# Patient Record
Sex: FEMALE | Race: OTHER | NOT HISPANIC OR LATINO
[De-identification: names, ages, dates, MRNs, and addresses within clinical notes are randomized per-mention and may not be internally consistent; named-entity substitution may affect disease eponyms.]

---

## 2017-02-22 ENCOUNTER — FORM ENCOUNTER (OUTPATIENT)
Age: 67
End: 2017-02-22

## 2017-02-23 ENCOUNTER — OUTPATIENT (OUTPATIENT)
Dept: OUTPATIENT SERVICES | Facility: HOSPITAL | Age: 67
LOS: 1 days | End: 2017-02-23
Payer: MEDICARE

## 2017-02-23 ENCOUNTER — APPOINTMENT (OUTPATIENT)
Dept: MRI IMAGING | Facility: CLINIC | Age: 67
End: 2017-02-23

## 2017-02-23 DIAGNOSIS — D11.0 BENIGN NEOPLASM OF PAROTID GLAND: ICD-10-CM

## 2017-02-23 PROCEDURE — A9585: CPT

## 2017-02-23 PROCEDURE — 82565 ASSAY OF CREATININE: CPT

## 2017-02-23 PROCEDURE — 70543 MRI ORBT/FAC/NCK W/O &W/DYE: CPT

## 2017-03-27 ENCOUNTER — APPOINTMENT (OUTPATIENT)
Dept: OTOLARYNGOLOGY | Facility: CLINIC | Age: 67
End: 2017-03-27

## 2017-03-27 VITALS
BODY MASS INDEX: 25.61 KG/M2 | WEIGHT: 140 LBS | SYSTOLIC BLOOD PRESSURE: 124 MMHG | DIASTOLIC BLOOD PRESSURE: 75 MMHG | HEART RATE: 83 BPM

## 2017-09-27 ENCOUNTER — FORM ENCOUNTER (OUTPATIENT)
Age: 67
End: 2017-09-27

## 2017-09-28 ENCOUNTER — APPOINTMENT (OUTPATIENT)
Dept: MRI IMAGING | Facility: CLINIC | Age: 67
End: 2017-09-28
Payer: MEDICARE

## 2017-09-28 ENCOUNTER — OUTPATIENT (OUTPATIENT)
Dept: OUTPATIENT SERVICES | Facility: HOSPITAL | Age: 67
LOS: 1 days | End: 2017-09-28
Payer: MEDICARE

## 2017-09-28 DIAGNOSIS — Z00.8 ENCOUNTER FOR OTHER GENERAL EXAMINATION: ICD-10-CM

## 2017-09-28 PROCEDURE — 70543 MRI ORBT/FAC/NCK W/O &W/DYE: CPT | Mod: 26

## 2017-09-29 PROCEDURE — 70543 MRI ORBT/FAC/NCK W/O &W/DYE: CPT

## 2017-09-29 PROCEDURE — 82565 ASSAY OF CREATININE: CPT

## 2017-09-29 PROCEDURE — A9585: CPT

## 2017-10-16 ENCOUNTER — APPOINTMENT (OUTPATIENT)
Dept: OTOLARYNGOLOGY | Facility: CLINIC | Age: 67
End: 2017-10-16
Payer: MEDICARE

## 2017-10-16 VITALS
SYSTOLIC BLOOD PRESSURE: 130 MMHG | BODY MASS INDEX: 25.61 KG/M2 | DIASTOLIC BLOOD PRESSURE: 71 MMHG | WEIGHT: 140 LBS | HEART RATE: 80 BPM

## 2017-10-16 PROCEDURE — 99214 OFFICE O/P EST MOD 30 MIN: CPT

## 2018-03-12 ENCOUNTER — FORM ENCOUNTER (OUTPATIENT)
Age: 68
End: 2018-03-12

## 2018-03-13 ENCOUNTER — OUTPATIENT (OUTPATIENT)
Dept: OUTPATIENT SERVICES | Facility: HOSPITAL | Age: 68
LOS: 1 days | End: 2018-03-13
Payer: MEDICARE

## 2018-03-13 ENCOUNTER — APPOINTMENT (OUTPATIENT)
Dept: ULTRASOUND IMAGING | Facility: CLINIC | Age: 68
End: 2018-03-13
Payer: MEDICARE

## 2018-03-13 DIAGNOSIS — D11.0 BENIGN NEOPLASM OF PAROTID GLAND: ICD-10-CM

## 2018-03-13 PROCEDURE — 76536 US EXAM OF HEAD AND NECK: CPT

## 2018-03-13 PROCEDURE — 76536 US EXAM OF HEAD AND NECK: CPT | Mod: 26

## 2018-05-29 ENCOUNTER — APPOINTMENT (OUTPATIENT)
Dept: OTOLARYNGOLOGY | Facility: CLINIC | Age: 68
End: 2018-05-29
Payer: MEDICARE

## 2018-05-29 VITALS
SYSTOLIC BLOOD PRESSURE: 149 MMHG | WEIGHT: 140 LBS | DIASTOLIC BLOOD PRESSURE: 73 MMHG | HEIGHT: 62 IN | HEART RATE: 80 BPM | BODY MASS INDEX: 25.76 KG/M2

## 2018-05-29 PROCEDURE — 99214 OFFICE O/P EST MOD 30 MIN: CPT

## 2018-10-29 ENCOUNTER — APPOINTMENT (OUTPATIENT)
Dept: OTOLARYNGOLOGY | Facility: CLINIC | Age: 68
End: 2018-10-29

## 2019-03-04 ENCOUNTER — INPATIENT (INPATIENT)
Facility: HOSPITAL | Age: 69
LOS: 2 days | Discharge: HOME | DRG: 202 | End: 2019-03-07
Attending: FAMILY MEDICINE | Admitting: FAMILY MEDICINE
Payer: MEDICARE

## 2019-03-04 VITALS — WEIGHT: 138.01 LBS

## 2019-03-04 DIAGNOSIS — J18.9 PNEUMONIA, UNSPECIFIED ORGANISM: ICD-10-CM

## 2019-03-04 LAB
ALBUMIN SERPL ELPH-MCNC: 3 G/DL — LOW (ref 3.3–5)
ALP SERPL-CCNC: 52 U/L — SIGNIFICANT CHANGE UP (ref 40–120)
ALT FLD-CCNC: 20 U/L — SIGNIFICANT CHANGE UP (ref 12–78)
ANION GAP SERPL CALC-SCNC: 7 MMOL/L — SIGNIFICANT CHANGE UP (ref 5–17)
APPEARANCE UR: CLEAR — SIGNIFICANT CHANGE UP
AST SERPL-CCNC: 15 U/L — SIGNIFICANT CHANGE UP (ref 15–37)
BASOPHILS # BLD AUTO: 0.03 K/UL — SIGNIFICANT CHANGE UP (ref 0–0.2)
BASOPHILS NFR BLD AUTO: 0.6 % — SIGNIFICANT CHANGE UP (ref 0–2)
BILIRUB SERPL-MCNC: 0.3 MG/DL — SIGNIFICANT CHANGE UP (ref 0.2–1.2)
BILIRUB UR-MCNC: NEGATIVE — SIGNIFICANT CHANGE UP
BUN SERPL-MCNC: 11 MG/DL — SIGNIFICANT CHANGE UP (ref 7–23)
CALCIUM SERPL-MCNC: 7.6 MG/DL — LOW (ref 8.5–10.1)
CHLORIDE SERPL-SCNC: 100 MMOL/L — SIGNIFICANT CHANGE UP (ref 96–108)
CO2 SERPL-SCNC: 25 MMOL/L — SIGNIFICANT CHANGE UP (ref 22–31)
COLOR SPEC: YELLOW — SIGNIFICANT CHANGE UP
CREAT SERPL-MCNC: 1 MG/DL — SIGNIFICANT CHANGE UP (ref 0.5–1.3)
DIFF PNL FLD: ABNORMAL
EOSINOPHIL # BLD AUTO: 0.06 K/UL — SIGNIFICANT CHANGE UP (ref 0–0.5)
EOSINOPHIL NFR BLD AUTO: 1.2 % — SIGNIFICANT CHANGE UP (ref 0–6)
FLU A RESULT: SIGNIFICANT CHANGE UP
FLU A RESULT: SIGNIFICANT CHANGE UP
FLUAV AG NPH QL: SIGNIFICANT CHANGE UP
FLUBV AG NPH QL: SIGNIFICANT CHANGE UP
GLUCOSE SERPL-MCNC: 98 MG/DL — SIGNIFICANT CHANGE UP (ref 70–99)
GLUCOSE UR QL: NEGATIVE — SIGNIFICANT CHANGE UP
HCT VFR BLD CALC: 41.6 % — SIGNIFICANT CHANGE UP (ref 34.5–45)
HGB BLD-MCNC: 13.8 G/DL — SIGNIFICANT CHANGE UP (ref 11.5–15.5)
IMM GRANULOCYTES NFR BLD AUTO: 0.4 % — SIGNIFICANT CHANGE UP (ref 0–1.5)
KETONES UR-MCNC: NEGATIVE — SIGNIFICANT CHANGE UP
LACTATE SERPL-SCNC: 1.8 MMOL/L — SIGNIFICANT CHANGE UP (ref 0.7–2)
LEUKOCYTE ESTERASE UR-ACNC: NEGATIVE — SIGNIFICANT CHANGE UP
LYMPHOCYTES # BLD AUTO: 0.59 K/UL — LOW (ref 1–3.3)
LYMPHOCYTES # BLD AUTO: 12 % — LOW (ref 13–44)
MCHC RBC-ENTMCNC: 27.4 PG — SIGNIFICANT CHANGE UP (ref 27–34)
MCHC RBC-ENTMCNC: 33.2 GM/DL — SIGNIFICANT CHANGE UP (ref 32–36)
MCV RBC AUTO: 82.5 FL — SIGNIFICANT CHANGE UP (ref 80–100)
MONOCYTES # BLD AUTO: 0.32 K/UL — SIGNIFICANT CHANGE UP (ref 0–0.9)
MONOCYTES NFR BLD AUTO: 6.5 % — SIGNIFICANT CHANGE UP (ref 2–14)
NEUTROPHILS # BLD AUTO: 3.91 K/UL — SIGNIFICANT CHANGE UP (ref 1.8–7.4)
NEUTROPHILS NFR BLD AUTO: 79.3 % — HIGH (ref 43–77)
NITRITE UR-MCNC: NEGATIVE — SIGNIFICANT CHANGE UP
NRBC # BLD: 0 /100 WBCS — SIGNIFICANT CHANGE UP (ref 0–0)
PH UR: 6.5 — SIGNIFICANT CHANGE UP (ref 5–8)
PLATELET # BLD AUTO: 376 K/UL — SIGNIFICANT CHANGE UP (ref 150–400)
POTASSIUM SERPL-MCNC: 4.4 MMOL/L — SIGNIFICANT CHANGE UP (ref 3.5–5.3)
POTASSIUM SERPL-SCNC: 4.4 MMOL/L — SIGNIFICANT CHANGE UP (ref 3.5–5.3)
PROT SERPL-MCNC: 5.9 G/DL — LOW (ref 6–8.3)
PROT UR-MCNC: 25 MG/DL
RBC # BLD: 5.04 M/UL — SIGNIFICANT CHANGE UP (ref 3.8–5.2)
RBC # FLD: 14.8 % — HIGH (ref 10.3–14.5)
RSV RESULT: SIGNIFICANT CHANGE UP
RSV RNA RESP QL NAA+PROBE: SIGNIFICANT CHANGE UP
SODIUM SERPL-SCNC: 132 MMOL/L — LOW (ref 135–145)
SP GR SPEC: 1.01 — SIGNIFICANT CHANGE UP (ref 1.01–1.02)
UROBILINOGEN FLD QL: NEGATIVE — SIGNIFICANT CHANGE UP
WBC # BLD: 4.93 K/UL — SIGNIFICANT CHANGE UP (ref 3.8–10.5)
WBC # FLD AUTO: 4.93 K/UL — SIGNIFICANT CHANGE UP (ref 3.8–10.5)

## 2019-03-04 PROCEDURE — 99285 EMERGENCY DEPT VISIT HI MDM: CPT

## 2019-03-04 PROCEDURE — 71046 X-RAY EXAM CHEST 2 VIEWS: CPT | Mod: 26

## 2019-03-04 PROCEDURE — 93010 ELECTROCARDIOGRAM REPORT: CPT

## 2019-03-04 PROCEDURE — 71260 CT THORAX DX C+: CPT | Mod: 26

## 2019-03-04 RX ORDER — HEPARIN SODIUM 5000 [USP'U]/ML
5000 INJECTION INTRAVENOUS; SUBCUTANEOUS EVERY 12 HOURS
Qty: 0 | Refills: 0 | Status: DISCONTINUED | OUTPATIENT
Start: 2019-03-04 | End: 2019-03-05

## 2019-03-04 RX ORDER — SODIUM CHLORIDE 9 MG/ML
1000 INJECTION, SOLUTION INTRAVENOUS
Qty: 0 | Refills: 0 | Status: DISCONTINUED | OUTPATIENT
Start: 2019-03-04 | End: 2019-03-07

## 2019-03-04 RX ORDER — IPRATROPIUM/ALBUTEROL SULFATE 18-103MCG
3 AEROSOL WITH ADAPTER (GRAM) INHALATION EVERY 6 HOURS
Qty: 0 | Refills: 0 | Status: DISCONTINUED | OUTPATIENT
Start: 2019-03-04 | End: 2019-03-07

## 2019-03-04 RX ORDER — IPRATROPIUM/ALBUTEROL SULFATE 18-103MCG
3 AEROSOL WITH ADAPTER (GRAM) INHALATION ONCE
Qty: 0 | Refills: 0 | Status: COMPLETED | OUTPATIENT
Start: 2019-03-04 | End: 2019-03-04

## 2019-03-04 RX ORDER — BUDESONIDE, MICRONIZED 100 %
0.5 POWDER (GRAM) MISCELLANEOUS
Qty: 0 | Refills: 0 | Status: DISCONTINUED | OUTPATIENT
Start: 2019-03-04 | End: 2019-03-07

## 2019-03-04 RX ORDER — DEXTROSE 50 % IN WATER 50 %
25 SYRINGE (ML) INTRAVENOUS ONCE
Qty: 0 | Refills: 0 | Status: DISCONTINUED | OUTPATIENT
Start: 2019-03-04 | End: 2019-03-07

## 2019-03-04 RX ORDER — DEXTROSE 50 % IN WATER 50 %
12.5 SYRINGE (ML) INTRAVENOUS ONCE
Qty: 0 | Refills: 0 | Status: DISCONTINUED | OUTPATIENT
Start: 2019-03-04 | End: 2019-03-07

## 2019-03-04 RX ORDER — SODIUM CHLORIDE 9 MG/ML
1000 INJECTION INTRAMUSCULAR; INTRAVENOUS; SUBCUTANEOUS ONCE
Qty: 0 | Refills: 0 | Status: COMPLETED | OUTPATIENT
Start: 2019-03-04 | End: 2019-03-04

## 2019-03-04 RX ORDER — ALBUTEROL 90 UG/1
1 AEROSOL, METERED ORAL EVERY 4 HOURS
Qty: 0 | Refills: 0 | Status: DISCONTINUED | OUTPATIENT
Start: 2019-03-04 | End: 2019-03-07

## 2019-03-04 RX ORDER — TIOTROPIUM BROMIDE 18 UG/1
1 CAPSULE ORAL; RESPIRATORY (INHALATION) DAILY
Qty: 0 | Refills: 0 | Status: DISCONTINUED | OUTPATIENT
Start: 2019-03-04 | End: 2019-03-07

## 2019-03-04 RX ORDER — DEXTROSE 50 % IN WATER 50 %
15 SYRINGE (ML) INTRAVENOUS ONCE
Qty: 0 | Refills: 0 | Status: DISCONTINUED | OUTPATIENT
Start: 2019-03-04 | End: 2019-03-07

## 2019-03-04 RX ORDER — LACTOBACILLUS ACIDOPHILUS 100MM CELL
1 CAPSULE ORAL DAILY
Qty: 0 | Refills: 0 | Status: DISCONTINUED | OUTPATIENT
Start: 2019-03-04 | End: 2019-03-05

## 2019-03-04 RX ORDER — INSULIN LISPRO 100/ML
VIAL (ML) SUBCUTANEOUS
Qty: 0 | Refills: 0 | Status: DISCONTINUED | OUTPATIENT
Start: 2019-03-04 | End: 2019-03-07

## 2019-03-04 RX ORDER — GLUCAGON INJECTION, SOLUTION 0.5 MG/.1ML
1 INJECTION, SOLUTION SUBCUTANEOUS ONCE
Qty: 0 | Refills: 0 | Status: DISCONTINUED | OUTPATIENT
Start: 2019-03-04 | End: 2019-03-07

## 2019-03-04 RX ORDER — DILTIAZEM HCL 120 MG
120 CAPSULE, EXT RELEASE 24 HR ORAL DAILY
Qty: 0 | Refills: 0 | Status: DISCONTINUED | OUTPATIENT
Start: 2019-03-04 | End: 2019-03-05

## 2019-03-04 RX ORDER — INSULIN LISPRO 100/ML
VIAL (ML) SUBCUTANEOUS AT BEDTIME
Qty: 0 | Refills: 0 | Status: DISCONTINUED | OUTPATIENT
Start: 2019-03-04 | End: 2019-03-07

## 2019-03-04 RX ADMIN — SODIUM CHLORIDE 1000 MILLILITER(S): 9 INJECTION INTRAMUSCULAR; INTRAVENOUS; SUBCUTANEOUS at 19:01

## 2019-03-04 RX ADMIN — SODIUM CHLORIDE 1000 MILLILITER(S): 9 INJECTION INTRAMUSCULAR; INTRAVENOUS; SUBCUTANEOUS at 20:58

## 2019-03-04 RX ADMIN — Medication 1: at 23:44

## 2019-03-04 RX ADMIN — SODIUM CHLORIDE 1000 MILLILITER(S): 9 INJECTION INTRAMUSCULAR; INTRAVENOUS; SUBCUTANEOUS at 20:57

## 2019-03-04 RX ADMIN — Medication 3 MILLILITER(S): at 18:59

## 2019-03-04 RX ADMIN — Medication 125 MILLIGRAM(S): at 18:59

## 2019-03-04 RX ADMIN — SODIUM CHLORIDE 1000 MILLILITER(S): 9 INJECTION INTRAMUSCULAR; INTRAVENOUS; SUBCUTANEOUS at 22:59

## 2019-03-04 NOTE — H&P ADULT - HISTORY OF PRESENT ILLNESS
CHIDI WELLS is a 67 YO Female brought to ER complaining of productive cough and fever intermittent for about 1 week. .  Patient has history of asthma, HTN, HLD & DM.  Patient was seen by her PCP Dr. Buckley, and prescribed antibiotics last week with no improvement of symptoms.  Fever at home 105 degree Farenheit.  No nausea or vomiting.  No chest pain.

## 2019-03-04 NOTE — ED ADULT NURSE NOTE - NSIMPLEMENTINTERV_GEN_ALL_ED
Implemented All Universal Safety Interventions:  [place] to call system. Call bell, personal items and telephone within reach. Instruct patient to call for assistance. Room bathroom lighting operational. Non-slip footwear when patient is off stretcher. Physically safe environment: no spills, clutter or unnecessary equipment. Stretcher in lowest position, wheels locked, appropriate side rails in place.

## 2019-03-04 NOTE — CHART NOTE - NSCHARTNOTEFT_GEN_A_CORE
consult dictated    Impression:    Community Acquired Pneumonia  Asthma with exacerbation    Plan:    IV antibiotics  IV steroids  Bronchodilators

## 2019-03-04 NOTE — ED PROVIDER NOTE - OBJECTIVE STATEMENT
69 y/o F with hx of asthma, HTN, HLD, DM with c/o productive cough and fever intermittent x 1 week.  Pt was seen by her PCP Dr. Buckley, and prescribed antibiotics last week with no improvement of symptoms. T max at home 105.  Last Tylenol 10 am

## 2019-03-04 NOTE — H&P ADULT - MUSCULOSKELETAL
details… detailed exam no joint erythema/no joint warmth/ROM intact/no calf tenderness/no joint swelling

## 2019-03-04 NOTE — ED ADULT NURSE NOTE - OBJECTIVE STATEMENT
Patient received in ED with c/o fever and cough since 4 days. Finished ABT course from [facility]. weak. Alert and oriented. Son at bedside. Ambulatory with assistance due to fever and weakness. Vitals checked and recorded.

## 2019-03-04 NOTE — ED ADULT NURSE REASSESSMENT NOTE - NS ED NURSE REASSESS COMMENT FT1
Patient is complaining of severe chills. Warm blankets provided. Taken for CXRay. Son at bedside. IV fluid bolus on flow.

## 2019-03-04 NOTE — H&P ADULT - NSHPLABSRESULTS_GEN_ALL_CORE
13.8   4.93  )-----------( 376      ( 04 Mar 2019 18:59 )             41.6     04 Mar 2019 19:49    132    |  100    |  11     ----------------------------<  98     4.4     |  25     |  1.00     Ca    7.6        04 Mar 2019 19:49    TPro  5.9    /  Alb  3.0    /  TBili  0.3    /  DBili  x      /  AST  15     /  ALT  20     /  AlkPhos  52     04 Mar 2019 19:49    LIVER FUNCTIONS - ( 04 Mar 2019 19:49 )  Alb: 3.0 g/dL / Pro: 5.9 g/dL / ALK PHOS: 52 U/L / ALT: 20 U/L / AST: 15 U/L / GGT: x             CAPILLARY BLOOD GLUCOSE      POCT Blood Glucose.: 294 mg/dL (04 Mar 2019 23:33)        Urinalysis Basic - ( 04 Mar 2019 18:59 )    Color: Yellow / Appearance: Clear / S.010 / pH: x  Gluc: x / Ketone: Negative  / Bili: Negative / Urobili: Negative   Blood: x / Protein: 25 mg/dL / Nitrite: Negative   Leuk Esterase: Negative / RBC: 3-5 /HPF / WBC 0-2   Sq Epi: x / Non Sq Epi: Occasional / Bacteria: x

## 2019-03-05 DIAGNOSIS — J18.9 PNEUMONIA, UNSPECIFIED ORGANISM: ICD-10-CM

## 2019-03-05 DIAGNOSIS — J45.901 UNSPECIFIED ASTHMA WITH (ACUTE) EXACERBATION: ICD-10-CM

## 2019-03-05 DIAGNOSIS — E11.9 TYPE 2 DIABETES MELLITUS WITHOUT COMPLICATIONS: ICD-10-CM

## 2019-03-05 DIAGNOSIS — I10 ESSENTIAL (PRIMARY) HYPERTENSION: ICD-10-CM

## 2019-03-05 LAB
CULTURE RESULTS: SIGNIFICANT CHANGE UP
ERYTHROCYTE [SEDIMENTATION RATE] IN BLOOD: 18 MM/HR — SIGNIFICANT CHANGE UP (ref 0–20)
HCV AB S/CO SERPL IA: 0.07 S/CO — SIGNIFICANT CHANGE UP (ref 0–0.79)
HCV AB SERPL-IMP: SIGNIFICANT CHANGE UP
SPECIMEN SOURCE: SIGNIFICANT CHANGE UP

## 2019-03-05 RX ORDER — LACTOBACILLUS ACIDOPHILUS 100MM CELL
1 CAPSULE ORAL
Qty: 0 | Refills: 0 | Status: DISCONTINUED | OUTPATIENT
Start: 2019-03-05 | End: 2019-03-07

## 2019-03-05 RX ORDER — GABAPENTIN 400 MG/1
100 CAPSULE ORAL
Qty: 0 | Refills: 0 | Status: DISCONTINUED | OUTPATIENT
Start: 2019-03-05 | End: 2019-03-07

## 2019-03-05 RX ORDER — HEPARIN SODIUM 5000 [USP'U]/ML
5000 INJECTION INTRAVENOUS; SUBCUTANEOUS EVERY 8 HOURS
Qty: 0 | Refills: 0 | Status: DISCONTINUED | OUTPATIENT
Start: 2019-03-05 | End: 2019-03-07

## 2019-03-05 RX ORDER — PANTOPRAZOLE SODIUM 20 MG/1
40 TABLET, DELAYED RELEASE ORAL
Qty: 0 | Refills: 0 | Status: DISCONTINUED | OUTPATIENT
Start: 2019-03-05 | End: 2019-03-07

## 2019-03-05 RX ORDER — GABAPENTIN 400 MG/1
100 CAPSULE ORAL THREE TIMES A DAY
Qty: 0 | Refills: 0 | Status: DISCONTINUED | OUTPATIENT
Start: 2019-03-05 | End: 2019-03-05

## 2019-03-05 RX ORDER — DILTIAZEM HCL 120 MG
120 CAPSULE, EXT RELEASE 24 HR ORAL DAILY
Qty: 0 | Refills: 0 | Status: DISCONTINUED | OUTPATIENT
Start: 2019-03-05 | End: 2019-03-07

## 2019-03-05 RX ADMIN — Medication 1: at 08:21

## 2019-03-05 RX ADMIN — Medication 0.5 MILLIGRAM(S): at 09:11

## 2019-03-05 RX ADMIN — Medication 3 MILLILITER(S): at 19:50

## 2019-03-05 RX ADMIN — Medication 1 TABLET(S): at 17:32

## 2019-03-05 RX ADMIN — Medication 4: at 12:31

## 2019-03-05 RX ADMIN — Medication 120 MILLIGRAM(S): at 05:27

## 2019-03-05 RX ADMIN — Medication 1 TABLET(S): at 08:22

## 2019-03-05 RX ADMIN — Medication 2.5 MILLIGRAM(S): at 05:27

## 2019-03-05 RX ADMIN — HEPARIN SODIUM 5000 UNIT(S): 5000 INJECTION INTRAVENOUS; SUBCUTANEOUS at 13:19

## 2019-03-05 RX ADMIN — GABAPENTIN 100 MILLIGRAM(S): 400 CAPSULE ORAL at 17:32

## 2019-03-05 RX ADMIN — Medication 4: at 17:20

## 2019-03-05 RX ADMIN — Medication 3 MILLILITER(S): at 08:15

## 2019-03-05 RX ADMIN — Medication 40 MILLIGRAM(S): at 05:27

## 2019-03-05 RX ADMIN — Medication 1 TABLET(S): at 13:19

## 2019-03-05 RX ADMIN — Medication 110 MILLIGRAM(S): at 13:19

## 2019-03-05 RX ADMIN — HEPARIN SODIUM 5000 UNIT(S): 5000 INJECTION INTRAVENOUS; SUBCUTANEOUS at 21:38

## 2019-03-05 RX ADMIN — Medication 0.5 MILLIGRAM(S): at 19:50

## 2019-03-05 RX ADMIN — HEPARIN SODIUM 5000 UNIT(S): 5000 INJECTION INTRAVENOUS; SUBCUTANEOUS at 05:27

## 2019-03-05 NOTE — PROGRESS NOTE ADULT - SUBJECTIVE AND OBJECTIVE BOX
Patient is a 68y old  Female who presents with a chief complaint of Pneumonia (05 Mar 2019 11:42)      INTERVAL /OVERNIGHT EVENTS: feels better than yesterday    MEDICATIONS  (STANDING):  ALBUTerol    90 MICROgram(s) HFA Inhaler 1 Puff(s) Inhalation every 4 hours  ALBUTerol/ipratropium for Nebulization 3 milliLiter(s) Nebulizer every 6 hours  buDESOnide   0.5 milliGRAM(s) Respule 0.5 milliGRAM(s) Inhalation two times a day  dextrose 5%. 1000 milliLiter(s) (50 mL/Hr) IV Continuous <Continuous>  dextrose 50% Injectable 12.5 Gram(s) IV Push once  dextrose 50% Injectable 25 Gram(s) IV Push once  dextrose 50% Injectable 25 Gram(s) IV Push once  diltiazem    milliGRAM(s) Oral daily  doxycycline IVPB      doxycycline IVPB 100 milliGRAM(s) IV Intermittent every 12 hours  enalapril 2.5 milliGRAM(s) Oral daily  gabapentin 100 milliGRAM(s) Oral two times a day  heparin  Injectable 5000 Unit(s) SubCutaneous every 8 hours  insulin lispro (HumaLOG) corrective regimen sliding scale   SubCutaneous three times a day before meals  insulin lispro (HumaLOG) corrective regimen sliding scale   SubCutaneous at bedtime  lactobacillus acidophilus 1 Tablet(s) Oral three times a day with meals  levoFLOXacin IVPB 750 milliGRAM(s) IV Intermittent every 24 hours  methylPREDNISolone sodium succinate Injectable 40 milliGRAM(s) IV Push daily  pantoprazole    Tablet 40 milliGRAM(s) Oral before breakfast  tiotropium 18 MICROgram(s) Capsule 1 Capsule(s) Inhalation daily    MEDICATIONS  (PRN):  dextrose 40% Gel 15 Gram(s) Oral once PRN Blood Glucose LESS THAN 70 milliGRAM(s)/deciLiter  glucagon  Injectable 1 milliGRAM(s) IntraMuscular once PRN Glucose <70 milliGRAM(s)/deciLiter      Allergies    penicillin (Unknown)    Intolerances        REVIEW OF SYSTEMS:  CONSTITUTIONAL: No fever, weight loss, or fatigue  EYES: No eye pain, visual disturbances, or discharge  ENMT:  No difficulty hearing, tinnitus, vertigo; No sinus or throat pain  NECK: No pain or stiffness  RESPIRATORY: No cough, wheezing, chills or hemoptysis; + shortness of breath  CARDIOVASCULAR: No chest pain, palpitations, dizziness, or leg swelling  GASTROINTESTINAL: No abdominal or epigastric pain. No nausea, vomiting, or hematemesis; No diarrhea or constipation. No melena or hematochezia.  GENITOURINARY: No dysuria, frequency, hematuria, or incontinence  NEUROLOGICAL: No headaches, memory loss, loss of strength, numbness, or tremors  SKIN: No itching, burning, rashes, or lesions   LYMPH NODES: No enlarged glands  ENDOCRINE: No heat or cold intolerance; No hair loss; No polydipsia or polyuria  MUSCULOSKELETAL: No joint pain or swelling; No muscle, back, or extremity pain  PSYCHIATRIC: No depression, anxiety, mood swings, or difficulty sleeping  HEME/LYMPH: No easy bruising, or bleeding gums  ALLERGY AND IMMUNOLOGIC: No hives or eczema    Vital Signs Last 24 Hrs  T(C): 37.4 (05 Mar 2019 14:03), Max: 37.4 (05 Mar 2019 14:03)  T(F): 99.4 (05 Mar 2019 14:03), Max: 99.4 (05 Mar 2019 14:03)  HR: 106 (05 Mar 2019 14:03) (82 - 106)  BP: 155/78 (05 Mar 2019 14:03) (133/80 - 155/78)  BP(mean): 99 (04 Mar 2019 22:27) (99 - 99)  RR: 18 (05 Mar 2019 14:03) (17 - 22)  SpO2: 97% (05 Mar 2019 14:03) (95% - 97%)    PHYSICAL EXAM:  GENERAL: NAD, well-groomed, well-developed  HEAD:  Atraumatic, Normocephalic  EYES: EOMI, PERRLA, conjunctiva and sclera clear  ENMT: No tonsillar erythema, exudates, or enlargement; Moist mucous membranes, Good dentition, No lesions  NECK: Supple, No JVD, Normal thyroid  NERVOUS SYSTEM:  Alert & Oriented X3, Good concentration; Motor Strength 5/5 B/L upper and lower extremities; DTRs 2+ intact and symmetric  CHEST/LUNG: Clear to auscultation bilaterally; No rales, rhonchi, wheezing, or rubs  HEART: Regular rate and rhythm; No murmurs, rubs, or gallops  ABDOMEN: Soft, Nontender, Nondistended; Bowel sounds present  EXTREMITIES:  2+ Peripheral Pulses, No clubbing, cyanosis, or edema  LYMPH: No lymphadenopathy noted  SKIN: No rashes or lesions    LABS:                        13.8   4.93  )-----------( 376      ( 04 Mar 2019 18:59 )             41.6     04 Mar 2019 19:49    132    |  100    |  11     ----------------------------<  98     4.4     |  25     |  1.00     Ca    7.6        04 Mar 2019 19:49    TPro  5.9    /  Alb  3.0    /  TBili  0.3    /  DBili  x      /  AST  15     /  ALT  20     /  AlkPhos  52     04 Mar 2019 19:49      Urinalysis Basic - ( 04 Mar 2019 18:59 )    Color: Yellow / Appearance: Clear / S.010 / pH: x  Gluc: x / Ketone: Negative  / Bili: Negative / Urobili: Negative   Blood: x / Protein: 25 mg/dL / Nitrite: Negative   Leuk Esterase: Negative / RBC: 3-5 /HPF / WBC 0-2   Sq Epi: x / Non Sq Epi: Occasional / Bacteria: x      CAPILLARY BLOOD GLUCOSE      POCT Blood Glucose.: 319 mg/dL (05 Mar 2019 11:50)  POCT Blood Glucose.: 180 mg/dL (05 Mar 2019 07:45)  POCT Blood Glucose.: 294 mg/dL (04 Mar 2019 23:33)      RADIOLOGY & ADDITIONAL TESTS:    Notes Reviewed:  [x ] YES  [ ] NO    Care Discussed with Consultants/Other Providers [x ] YES  [ ] NO

## 2019-03-05 NOTE — PROGRESS NOTE ADULT - SUBJECTIVE AND OBJECTIVE BOX
Patient is a 68y old  Female who presents with a chief complaint of Pneumonia (05 Mar 2019 15:12)      INTERVAL HPI/OVERNIGHT EVENTS:    No change in cough, sputum production and shortness of breath    MEDICATIONS  (STANDING):  ALBUTerol    90 MICROgram(s) HFA Inhaler 1 Puff(s) Inhalation every 4 hours  ALBUTerol/ipratropium for Nebulization 3 milliLiter(s) Nebulizer every 6 hours  buDESOnide   0.5 milliGRAM(s) Respule 0.5 milliGRAM(s) Inhalation two times a day  dextrose 5%. 1000 milliLiter(s) (50 mL/Hr) IV Continuous <Continuous>  dextrose 50% Injectable 12.5 Gram(s) IV Push once  dextrose 50% Injectable 25 Gram(s) IV Push once  dextrose 50% Injectable 25 Gram(s) IV Push once  diltiazem    milliGRAM(s) Oral daily  doxycycline IVPB      doxycycline IVPB 100 milliGRAM(s) IV Intermittent every 12 hours  enalapril 2.5 milliGRAM(s) Oral daily  gabapentin 100 milliGRAM(s) Oral two times a day  heparin  Injectable 5000 Unit(s) SubCutaneous every 8 hours  insulin lispro (HumaLOG) corrective regimen sliding scale   SubCutaneous three times a day before meals  insulin lispro (HumaLOG) corrective regimen sliding scale   SubCutaneous at bedtime  lactobacillus acidophilus 1 Tablet(s) Oral three times a day with meals  levoFLOXacin IVPB 750 milliGRAM(s) IV Intermittent every 24 hours  methylPREDNISolone sodium succinate Injectable 40 milliGRAM(s) IV Push daily  pantoprazole    Tablet 40 milliGRAM(s) Oral before breakfast  tiotropium 18 MICROgram(s) Capsule 1 Capsule(s) Inhalation daily      MEDICATIONS  (PRN):  dextrose 40% Gel 15 Gram(s) Oral once PRN Blood Glucose LESS THAN 70 milliGRAM(s)/deciLiter  glucagon  Injectable 1 milliGRAM(s) IntraMuscular once PRN Glucose <70 milliGRAM(s)/deciLiter      Allergies    penicillin (Unknown)    Intolerances        PAST MEDICAL & SURGICAL HISTORY:  Asthma  HTN (hypertension)  DM (diabetes mellitus)  No significant past surgical history      Vital Signs Last 24 Hrs  T(C): 37 (05 Mar 2019 20:29), Max: 37.4 (05 Mar 2019 14:03)  T(F): 98.6 (05 Mar 2019 20:29), Max: 99.4 (05 Mar 2019 14:03)  HR: 108 (05 Mar 2019 20:29) (82 - 113)  BP: 144/70 (05 Mar 2019 20:29) (133/80 - 155/78)  BP(mean): 99 (04 Mar 2019 22:27) (99 - 99)  RR: 19 (05 Mar 2019 20:29) (17 - 20)  SpO2: 95% (05 Mar 2019 20:29) (95% - 97%)    PHYSICAL EXAMINATION:    GENERAL: The patient is awake and alert in no apparent distress.     HEENT: Head is normocephalic and atraumatic. Extraocular muscles are intact. Mucous membranes are moist.    NECK: Supple.    LUNGS: bilateral wheezes and rhonchi    HEART: Regular rate and rhythm without murmur.    ABDOMEN: Soft, nontender, and nondistended.      EXTREMITIES: Without any cyanosis, clubbing, rash, lesions or edema.    NEUROLOGIC: Grossly intact.    SKIN: No ulceration or induration present.      LABS:                        13.8   4.93  )-----------( 376      ( 04 Mar 2019 18:59 )             41.6     03-    132<L>  |  100  |  11  ----------------------------<  98  4.4   |  25  |  1.00    Ca    7.6<L>      04 Mar 2019 19:49    TPro  5.9<L>  /  Alb  3.0<L>  /  TBili  0.3  /  DBili  x   /  AST  15  /  ALT  20  /  AlkPhos  52  03-04      Urinalysis Basic - ( 04 Mar 2019 18:59 )    Color: Yellow / Appearance: Clear / S.010 / pH: x  Gluc: x / Ketone: Negative  / Bili: Negative / Urobili: Negative   Blood: x / Protein: 25 mg/dL / Nitrite: Negative   Leuk Esterase: Negative / RBC: 3-5 /HPF / WBC 0-2   Sq Epi: x / Non Sq Epi: Occasional / Bacteria: x                  Procalcitonin, Serum: <0.05 (19 @ 19:49)      MICROBIOLOGY:  Culture Results:   <10,000 CFU/ml  Normal Urogenital darnell present ( @ 23:09)      RADIOLOGY & ADDITIONAL STUDIES:    Assessment:    Multilobar Pneumonia  Asthma with exacerbation  Hx Hypertension  Hx Diabetes    Plan:    Continue steroids + antibiotics + bronchodilators Patient is a 68y old  Female who presents with a chief complaint of Pneumonia (05 Mar 2019 15:12)      INTERVAL HPI/OVERNIGHT EVENTS:    No change in cough, sputum production and shortness of breath    MEDICATIONS  (STANDING):  ALBUTerol    90 MICROgram(s) HFA Inhaler 1 Puff(s) Inhalation every 4 hours  ALBUTerol/ipratropium for Nebulization 3 milliLiter(s) Nebulizer every 6 hours  buDESOnide   0.5 milliGRAM(s) Respule 0.5 milliGRAM(s) Inhalation two times a day  dextrose 5%. 1000 milliLiter(s) (50 mL/Hr) IV Continuous <Continuous>  dextrose 50% Injectable 12.5 Gram(s) IV Push once  dextrose 50% Injectable 25 Gram(s) IV Push once  dextrose 50% Injectable 25 Gram(s) IV Push once  diltiazem    milliGRAM(s) Oral daily  doxycycline IVPB      doxycycline IVPB 100 milliGRAM(s) IV Intermittent every 12 hours  enalapril 2.5 milliGRAM(s) Oral daily  gabapentin 100 milliGRAM(s) Oral two times a day  heparin  Injectable 5000 Unit(s) SubCutaneous every 8 hours  insulin lispro (HumaLOG) corrective regimen sliding scale   SubCutaneous three times a day before meals  insulin lispro (HumaLOG) corrective regimen sliding scale   SubCutaneous at bedtime  lactobacillus acidophilus 1 Tablet(s) Oral three times a day with meals  levoFLOXacin IVPB 750 milliGRAM(s) IV Intermittent every 24 hours  methylPREDNISolone sodium succinate Injectable 40 milliGRAM(s) IV Push daily  pantoprazole    Tablet 40 milliGRAM(s) Oral before breakfast  tiotropium 18 MICROgram(s) Capsule 1 Capsule(s) Inhalation daily      MEDICATIONS  (PRN):  dextrose 40% Gel 15 Gram(s) Oral once PRN Blood Glucose LESS THAN 70 milliGRAM(s)/deciLiter  glucagon  Injectable 1 milliGRAM(s) IntraMuscular once PRN Glucose <70 milliGRAM(s)/deciLiter      Allergies    penicillin (Unknown)    Intolerances        PAST MEDICAL & SURGICAL HISTORY:  Asthma  HTN (hypertension)  DM (diabetes mellitus)  No significant past surgical history      Vital Signs Last 24 Hrs  T(C): 37 (05 Mar 2019 20:29), Max: 37.4 (05 Mar 2019 14:03)  T(F): 98.6 (05 Mar 2019 20:29), Max: 99.4 (05 Mar 2019 14:03)  HR: 108 (05 Mar 2019 20:29) (82 - 113)  BP: 144/70 (05 Mar 2019 20:29) (133/80 - 155/78)  BP(mean): 99 (04 Mar 2019 22:27) (99 - 99)  RR: 19 (05 Mar 2019 20:29) (17 - 20)  SpO2: 95% (05 Mar 2019 20:29) (95% - 97%)    PHYSICAL EXAMINATION:    GENERAL: The patient is awake and alert in no apparent distress.     HEENT: Head is normocephalic and atraumatic. Extraocular muscles are intact. Mucous membranes are moist.    NECK: Supple.    LUNGS: bilateral wheezes and rhonchi    HEART: Regular rate and rhythm without murmur.    ABDOMEN: Soft, nontender, and nondistended.      EXTREMITIES: Without any cyanosis, clubbing, rash, lesions or edema.    NEUROLOGIC: Grossly intact.    SKIN: No ulceration or induration present.      LABS:                        13.8   4.93  )-----------( 376      ( 04 Mar 2019 18:59 )             41.6     03-    132<L>  |  100  |  11  ----------------------------<  98  4.4   |  25  |  1.00    Ca    7.6<L>      04 Mar 2019 19:49    TPro  5.9<L>  /  Alb  3.0<L>  /  TBili  0.3  /  DBili  x   /  AST  15  /  ALT  20  /  AlkPhos  52  03-04      Urinalysis Basic - ( 04 Mar 2019 18:59 )    Color: Yellow / Appearance: Clear / S.010 / pH: x  Gluc: x / Ketone: Negative  / Bili: Negative / Urobili: Negative   Blood: x / Protein: 25 mg/dL / Nitrite: Negative   Leuk Esterase: Negative / RBC: 3-5 /HPF / WBC 0-2   Sq Epi: x / Non Sq Epi: Occasional / Bacteria: x                  Procalcitonin, Serum: <0.05 (19 @ 19:49)      MICROBIOLOGY:  Culture Results:   <10,000 CFU/ml  Normal Urogenital darnell present ( @ 23:09)      RADIOLOGY & ADDITIONAL STUDIES:    Assessment:    Multilobar Pneumonia  Asthma with exacerbation  Hx Hypertension  Hx Diabetes    Plan:    Continue steroids + antibiotics + bronchodilators  Chest x ray performed in 2018 at [facility] was essentially negative

## 2019-03-05 NOTE — CONSULT NOTE ADULT - SUBJECTIVE AND OBJECTIVE BOX
Infectious Diseases Consult by Sidra Theodore MD    Reason for Consult :Bilateral multifocal pneumonia    HPI:  CHIDI WELLS is a 69 YO Female hx of asthma, HTN, type 2 DM brought to ER complaining of productive cough and fever intermittent for about 3 weeks .  Patient was seen by her PCP Dr. Buckley, and prescribed Zithromax x 1 week  last week with no improvement of symptoms.  Fever at home 105 degree Farenheit.  No nausea or vomiting.  No chest pain.     she was in [place] x 1 month came back on 19, she was well x 2 weeks after her return to [place]. She has many environmental allergies and also allergic to Birds . her son has 2 pet birds for past 1 year and her asthma had gotten worse. she sees Dr. Kristy Patterson for pulmonary     CXR and CT chest showed bilateral multifocal pn    Past Medical & Surgical Hx:  PAST MEDICAL & SURGICAL HISTORY:  Asthma  HTN (hypertension)  DM (diabetes mellitus)  No significant past surgical history      Social History-- Retired lives with Son   EtOH: denies   Tobacco: denies   Drug Use: denies     Travel/Environmental/Occupational History: as above       FAMILY HISTORY:  No pertinent family history in first degree relatives      Allergies    penicillin (Unknown)    Intolerances        Home/ Out patient  Medications :  Home Medications:  dilTIAZem:  (04 Mar 2019 21:41)  enalapril:  (04 Mar 2019 21:41)  metFORMIN:  (04 Mar 2019 21:41)  ProAir HFA:  (04 Mar 2019 21:41)      Current Inpatient Medications :    ANTIBIOTICS:   levoFLOXacin IVPB 750 milliGRAM(s) IV Intermittent every 24 hours      OTHER RELEVANT MEDICATIONS :  ALBUTerol    90 MICROgram(s) HFA Inhaler 1 Puff(s) Inhalation every 4 hours  ALBUTerol/ipratropium for Nebulization 3 milliLiter(s) Nebulizer every 6 hours  buDESOnide   0.5 milliGRAM(s) Respule 0.5 milliGRAM(s) Inhalation two times a day  dextrose 40% Gel 15 Gram(s) Oral once PRN  dextrose 5%. 1000 milliLiter(s) IV Continuous <Continuous>  dextrose 50% Injectable 12.5 Gram(s) IV Push once  dextrose 50% Injectable 25 Gram(s) IV Push once  dextrose 50% Injectable 25 Gram(s) IV Push once  diltiazem    milliGRAM(s) Oral daily  enalapril 2.5 milliGRAM(s) Oral daily  gabapentin 100 milliGRAM(s) Oral two times a day  glucagon  Injectable 1 milliGRAM(s) IntraMuscular once PRN  heparin  Injectable 5000 Unit(s) SubCutaneous every 8 hours  insulin lispro (HumaLOG) corrective regimen sliding scale   SubCutaneous three times a day before meals  insulin lispro (HumaLOG) corrective regimen sliding scale   SubCutaneous at bedtime  methylPREDNISolone sodium succinate Injectable 40 milliGRAM(s) IV Push daily  pantoprazole    Tablet 40 milliGRAM(s) Oral before breakfast  tiotropium 18 MICROgram(s) Capsule 1 Capsule(s) Inhalation daily      ROS:  CONSTITUTIONAL:  Positive for  fever or chills, feels well, good appetite  EYES:  Negative  blurry vision or double vision  CARDIOVASCULAR:  Negative for chest pain or palpitations  RESPIRATORY:  positive  for cough, wheezing, and  SOB   GASTROINTESTINAL:  Negative for nausea, vomiting, diarrhea, constipation, or abdominal pain  GENITOURINARY:  Negative frequency, urgency , dysuria or hematuria   NEUROLOGIC:  No headache, confusion, dizziness, lightheadedness  All other systems were reviewed and are negative          I&O's Detail      Physical Exam:  Vital Signs Last 24 Hrs  T(C): 37 (05 Mar 2019 05:05), Max: 37.2 (04 Mar 2019 22:27)  T(F): 98.6 (05 Mar 2019 05:05), Max: 99 (04 Mar 2019 22:27)  HR: 82 (05 Mar 2019 05:05) (82 - 101)  BP: 133/80 (05 Mar 2019 05:05) (133/80 - 150/73)  BP(mean): 99 (04 Mar 2019 22:27) (99 - 99)  RR: 17 (05 Mar 2019 05:05) (17 - 22)  SpO2: 96% (05 Mar 2019 05:05) (95% - 97%)    Weight (kg): 62.6 (03-04 @ 18:01)    General: well developed well nourished, in no acute distress  Eyes: sclera anicteric, pupils equal and reactive to light  ENMT: buccal mucosa moist, pharynx not injected  Neck: supple, trachea midline  Lungs: coarse rhonchi bilaterally .  Cardiovascular: regular rate and rhythm, S1 S2  Abdomen: soft, nontender, no organomegaly present, bowel sounds normal  Neurological:  alert and oriented x3, Cranial Nerves II-XII grossly intact  Skin: no increased ecchymosis/petechiae/purpura  Lymph Nodes: no palpable cervical/supraclavicular lymph nodes enlargements  Extremities: no cyanosis/clubbing/edema    Labs:              13.8   4.93   )----------(  376       ( 04 Mar 2019 18:59 )               41.6      132    |  100    |  11     ----------------------------<  98         ( 04 Mar 2019 19:49 )  4.4     |  25     |  1.00     Ca    7.6        ( 04 Mar 2019 19:49 )    TPro  5.9    /  Alb  3.0    /  TBili  0.3    /  DBili  x      /  AST  15     /  ALT  20     /  AlkPhos  52     ( 04 Mar 2019 19:49 )    LIVER FUNCTIONS - ( 04 Mar 2019 19:49 )  Alb: 3.0 g/dL / Pro: 5.9 g/dL / ALK PHOS: 52 U/L / ALT: 20 U/L / AST: 15 U/L / GGT: x           Lactate, Blood: 1.8 mmol/L (19 @ 18:54)        CAPILLARY BLOOD GLUCOSE        Urinalysis Basic - ( 04 Mar 2019 18:59 )    Color: Yellow / Appearance: Clear / S.010 / pH: x  Gluc: x / Ketone: Negative  / Bili: Negative / Urobili: Negative   Blood: x / Protein: 25 mg/dL / Nitrite: Negative   Leuk Esterase: Negative / RBC: 3-5 /HPF / WBC 0-2   Sq Epi: x / Non Sq Epi: Occasional / Bacteria: x      RECENT CULTURES:    FLU A B RSV Detection by PCR (19 @ 18:54)    Flu A Result: [facility]: The Flu A B RSV assay is a Real-Time PCR test for the qualitative  detection and differentiation of Influenza A, Influenza B, and  Respiratory Syncytial Virus on nasopharyngeal swabs. The results should  be interpreted in the context of all clinical and laboratory findings.    Flu B Result: [facility]    RSV Result: [facility]          RADIOLOGY & ADDITIONAL STUDIES:  CT Chest w/ IV Cont (19 @ 20:45) >  FINDINGS:    LUNGS AND LARGE AIRWAYS: Patchy consolidation in the right upper lobe   with additional patchy opacities in the remaining lungs, particularly in   the right middle lobe and both lower lobes.  PLEURA: No pleural effusion.  VESSELS: Atherosclerotic calcifications.   HEART: Heart size is normal. No pericardial effusion.  MEDIASTINUM AND MUKESH: Mediastinal adenopathy. For example:  *  Prevascular node (series 2, image 26), measuring 1.4 x 1.3 cm  *  Right paratracheal node (series 2, image 34), measuring 1.7 x 1.6 cm  CHEST WALL AND LOWER NECK: Within normal limits.  VISUALIZED UPPER ABDOMEN: Increased size of cyst in the caudate lobe   (series 2, image 89), measuring 5.2 x 3.6 cm, previously 3.2 x 2.0 cm.   Possible left renal cyst.  BONES: Degenerative changes of the spine.    IMPRESSION:     Multifocal pneumonia.    Mediastinal adenopathy, probably reactive. Follow-up to complete   resolution is recommended to exclude underlying neoplasm.    : Xray Chest 2 Views PA/Lat (19 @ 19:10) >  LUNGS/PLEURA: New consolidation in the right upper lobe. No pleural   effusion or pneumothorax.  MEDIASTINUM: Cardiomediastinal silhouette is unremarkable.  OTHER: None.    IMPRESSION:     New consolidation in the right upper lobe, possibly pneumonia in the   appropriate clinical setting. Follow-up to complete resolution is   recommended to exclude underlying neoplasm.      Assessment :     CHIDI WELLS is a 69 YO Female hx of asthma, HTN, type 2 DM brought to ER complaining of productive cough and fever intermittent for about 3 weeks associated with fever no night sweats or weight loss or hemoptysis. has 2 birds atr home feels her asthma has gotten worse after the birds came,    CT chest shows bilateral nodular infiltrates . Need to be concerned about Atypical pneumonia like Psittacosis. Also should consider hypersensitivity pneumonia     Plan :   - will add Doxycycline to Levaquin   - send serology for psittacosis, sed rate, CRP, hypersensitivity panel, IgE and QuantiFeron  - serial CXR   - obtain records form pulmonary office and review [facility] cx[facility]    - increase activity   - pulse oxymetry     Continue with present regime .  Appropriate use of antibiotics and adverse effects reviewed.      I have discussed the above plan of care with patient and her daughter family in detail. They expressed understanding of the treatment plan . Risks, benefits and alternatives discussed in detail. I have asked if they have any questions or concerns and appropriately addressed them to the best of my ability .      > 65  minutes spent in direct patient care reviewing  the notes, lab data/ imaging , discussion with multidisciplinary team. All questions were addressed and answered to the best of my capacity .    Thank you for allowing me to participate in the care of your patient .      Sidra Theodore MD  [phone]

## 2019-03-05 NOTE — PHARMACOTHERAPY INTERVENTION NOTE - COMMENTS
SCr/CrCl: 1/51.9    Spoke with physician and recommended renally adjusting IV levofloxacin to 750mg daily for PNA.  Also recommended renally adjusting gabapentin to a maximum of BID.

## 2019-03-06 LAB
CRP SERPL-MCNC: 2.28 MG/DL — HIGH (ref 0–0.4)
CRYPTOC AG FLD QL: NEGATIVE — SIGNIFICANT CHANGE UP
HMPV RNA SPEC QL NAA+PROBE: DETECTED
IGE SERPL-ACNC: 359 IU/ML — HIGH
RAPID RVP RESULT: DETECTED

## 2019-03-06 RX ADMIN — Medication 110 MILLIGRAM(S): at 06:09

## 2019-03-06 RX ADMIN — GABAPENTIN 100 MILLIGRAM(S): 400 CAPSULE ORAL at 06:08

## 2019-03-06 RX ADMIN — HEPARIN SODIUM 5000 UNIT(S): 5000 INJECTION INTRAVENOUS; SUBCUTANEOUS at 06:08

## 2019-03-06 RX ADMIN — Medication 1 TABLET(S): at 12:30

## 2019-03-06 RX ADMIN — Medication 0.5 MILLIGRAM(S): at 07:37

## 2019-03-06 RX ADMIN — Medication 0.5 MILLIGRAM(S): at 20:37

## 2019-03-06 RX ADMIN — GABAPENTIN 100 MILLIGRAM(S): 400 CAPSULE ORAL at 18:04

## 2019-03-06 RX ADMIN — Medication 3 MILLILITER(S): at 14:42

## 2019-03-06 RX ADMIN — Medication 1 TABLET(S): at 08:10

## 2019-03-06 RX ADMIN — Medication 1 TABLET(S): at 18:00

## 2019-03-06 RX ADMIN — Medication 100 MILLIGRAM(S): at 18:08

## 2019-03-06 RX ADMIN — Medication 4: at 12:30

## 2019-03-06 RX ADMIN — PANTOPRAZOLE SODIUM 40 MILLIGRAM(S): 20 TABLET, DELAYED RELEASE ORAL at 06:08

## 2019-03-06 RX ADMIN — HEPARIN SODIUM 5000 UNIT(S): 5000 INJECTION INTRAVENOUS; SUBCUTANEOUS at 21:25

## 2019-03-06 RX ADMIN — Medication 3 MILLILITER(S): at 20:37

## 2019-03-06 RX ADMIN — Medication 40 MILLIGRAM(S): at 06:08

## 2019-03-06 RX ADMIN — Medication 1: at 08:10

## 2019-03-06 RX ADMIN — Medication 3 MILLILITER(S): at 07:37

## 2019-03-06 RX ADMIN — Medication 120 MILLIGRAM(S): at 06:08

## 2019-03-06 RX ADMIN — Medication 2: at 17:58

## 2019-03-06 RX ADMIN — Medication 2.5 MILLIGRAM(S): at 06:08

## 2019-03-06 RX ADMIN — Medication 600 MILLIGRAM(S): at 18:04

## 2019-03-06 RX ADMIN — HEPARIN SODIUM 5000 UNIT(S): 5000 INJECTION INTRAVENOUS; SUBCUTANEOUS at 18:00

## 2019-03-06 NOTE — PHARMACOTHERAPY INTERVENTION NOTE - COMMENTS
Day 2 of IV levofloxacin 750mg q24h and PO doxycycline for the treatment of pneumonia.     Spoke with physician and recommended changing IV levofloxacin to PO.

## 2019-03-06 NOTE — PHYSICAL THERAPY INITIAL EVALUATION ADULT - RANGE OF MOTION EXAMINATION, REHAB EVAL
bilateral upper extremity ROM was WFL (within functional limits) bilateral upper extremity ROM was WFL (within functional limits)/bilateral lower extremity ROM was WFL (within functional limits)

## 2019-03-06 NOTE — PHYSICAL THERAPY INITIAL EVALUATION ADULT - ADDITIONAL COMMENTS
Patient lives in a private home steps, but patient does not use them. Patient stays on main floor. Patient states she ambulates around the home for exercise.

## 2019-03-06 NOTE — PROGRESS NOTE ADULT - SUBJECTIVE AND OBJECTIVE BOX
Patient is a 68y old  Female who presents with a chief complaint of Pneumonia (05 Mar 2019 21:28)      INTERVAL /OVERNIGHT EVENTS: feels much better today    MEDICATIONS  (STANDING):  ALBUTerol    90 MICROgram(s) HFA Inhaler 1 Puff(s) Inhalation every 4 hours  ALBUTerol/ipratropium for Nebulization 3 milliLiter(s) Nebulizer every 6 hours  buDESOnide   0.5 milliGRAM(s) Respule 0.5 milliGRAM(s) Inhalation two times a day  dextrose 5%. 1000 milliLiter(s) (50 mL/Hr) IV Continuous <Continuous>  dextrose 50% Injectable 12.5 Gram(s) IV Push once  dextrose 50% Injectable 25 Gram(s) IV Push once  dextrose 50% Injectable 25 Gram(s) IV Push once  diltiazem    milliGRAM(s) Oral daily  doxycycline IVPB      doxycycline IVPB 100 milliGRAM(s) IV Intermittent every 12 hours  enalapril 2.5 milliGRAM(s) Oral daily  gabapentin 100 milliGRAM(s) Oral two times a day  heparin  Injectable 5000 Unit(s) SubCutaneous every 8 hours  insulin lispro (HumaLOG) corrective regimen sliding scale   SubCutaneous three times a day before meals  insulin lispro (HumaLOG) corrective regimen sliding scale   SubCutaneous at bedtime  lactobacillus acidophilus 1 Tablet(s) Oral three times a day with meals  levoFLOXacin IVPB 750 milliGRAM(s) IV Intermittent every 24 hours  pantoprazole    Tablet 40 milliGRAM(s) Oral before breakfast  predniSONE   Tablet 50 milliGRAM(s) Oral daily  tiotropium 18 MICROgram(s) Capsule 1 Capsule(s) Inhalation daily    MEDICATIONS  (PRN):  dextrose 40% Gel 15 Gram(s) Oral once PRN Blood Glucose LESS THAN 70 milliGRAM(s)/deciLiter  glucagon  Injectable 1 milliGRAM(s) IntraMuscular once PRN Glucose <70 milliGRAM(s)/deciLiter      Allergies    penicillin (Unknown)    Intolerances        REVIEW OF SYSTEMS:  CONSTITUTIONAL: No fever, weight loss, or fatigue  EYES: No eye pain, visual disturbances, or discharge  ENMT:  No difficulty hearing, tinnitus, vertigo; No sinus or throat pain  NECK: No pain or stiffness  RESPIRATORY: No cough, wheezing, chills or hemoptysis; No shortness of breath  CARDIOVASCULAR: No chest pain, palpitations, dizziness, or leg swelling  GASTROINTESTINAL: No abdominal or epigastric pain. No nausea, vomiting, or hematemesis; No diarrhea or constipation. No melena or hematochezia.  GENITOURINARY: No dysuria, frequency, hematuria, or incontinence  NEUROLOGICAL: No headaches, memory loss, loss of strength, numbness, or tremors  SKIN: No itching, burning, rashes, or lesions   LYMPH NODES: No enlarged glands  ENDOCRINE: No heat or cold intolerance; No hair loss; No polydipsia or polyuria  MUSCULOSKELETAL: No joint pain or swelling; No muscle, back, or extremity pain  PSYCHIATRIC: No depression, anxiety, mood swings, or difficulty sleeping  HEME/LYMPH: No easy bruising, or bleeding gums  ALLERGY AND IMMUNOLOGIC: No hives or eczema    Vital Signs Last 24 Hrs  T(C): 36.7 (06 Mar 2019 05:32), Max: 37.4 (05 Mar 2019 14:03)  T(F): 98.1 (06 Mar 2019 05:32), Max: 99.4 (05 Mar 2019 14:03)  HR: 76 (06 Mar 2019 07:40) (76 - 113)  BP: 145/76 (06 Mar 2019 05:32) (144/70 - 155/78)  BP(mean): --  RR: 18 (06 Mar 2019 05:32) (18 - 19)  SpO2: 96% (06 Mar 2019 07:40) (95% - 98%)    PHYSICAL EXAM:  GENERAL: NAD, well-groomed, well-developed  HEAD:  Atraumatic, Normocephalic  EYES: EOMI, PERRLA, conjunctiva and sclera clear  ENMT: No tonsillar erythema, exudates, or enlargement; Moist mucous membranes, Good dentition, No lesions  NECK: Supple, No JVD, Normal thyroid  NERVOUS SYSTEM:  Alert & Oriented X3, Good concentration; Motor Strength 5/5 B/L upper and lower extremities; DTRs 2+ intact and symmetric  CHEST/LUNG: Clear to auscultation bilaterally; No rales, rhonchi, wheezing, or rubs  HEART: Regular rate and rhythm; No murmurs, rubs, or gallops  ABDOMEN: Soft, Nontender, Nondistended; Bowel sounds present  EXTREMITIES:  2+ Peripheral Pulses, No clubbing, cyanosis, or edema  LYMPH: No lymphadenopathy noted  SKIN: No rashes or lesions    LABS:      Ca    7.6        04 Mar 2019 19:49        Urinalysis Basic - ( 04 Mar 2019 18:59 )    Color: Yellow / Appearance: Clear / S.010 / pH: x  Gluc: x / Ketone: Negative  / Bili: Negative / Urobili: Negative   Blood: x / Protein: 25 mg/dL / Nitrite: Negative   Leuk Esterase: Negative / RBC: 3-5 /HPF / WBC 0-2   Sq Epi: x / Non Sq Epi: Occasional / Bacteria: x      CAPILLARY BLOOD GLUCOSE      POCT Blood Glucose.: 164 mg/dL (06 Mar 2019 07:45)  POCT Blood Glucose.: 229 mg/dL (05 Mar 2019 21:37)  POCT Blood Glucose.: 309 mg/dL (05 Mar 2019 16:43)  POCT Blood Glucose.: 319 mg/dL (05 Mar 2019 11:50)      RADIOLOGY & ADDITIONAL TESTS:    Notes Reviewed:  [ x] YES  [ ] NO    Care Discussed with Consultants/Other Providers [x ] YES  [ ] NO

## 2019-03-06 NOTE — PROGRESS NOTE ADULT - SUBJECTIVE AND OBJECTIVE BOX
ID Progress note     Name: CHIDI WELLS  Age: 68y  Gender: Female  MRN: [id number]    Interval History-- Events noted, feels better, les cough . No nausea or vomiting . RVP positive for HMPV, according to patient her grandson was sick few weeks ago   Notes reviewed    Past Medical History--  Asthma  HTN (hypertension)  DM (diabetes mellitus)  No significant past surgical history      For details regarding the patient's social history, family history, and other miscellaneous elements, please refer the initial infectious diseases consultation and/or the admitting history and physical examination for this admission.    Allergies--  Allergies    penicillin (Unknown)    Intolerances        Medications--  Antibiotics:  doxycycline IVPB      doxycycline IVPB 100 milliGRAM(s) IV Intermittent every 12 hours  levoFLOXacin IVPB 750 milliGRAM(s) IV Intermittent every 24 hours    Immunologic:    Other:  ALBUTerol    90 MICROgram(s) HFA Inhaler  ALBUTerol/ipratropium for Nebulization  buDESOnide   0.5 milliGRAM(s) Respule  dextrose 40% Gel PRN  dextrose 5%.  dextrose 50% Injectable  dextrose 50% Injectable  dextrose 50% Injectable  diltiazem   CD  enalapril  gabapentin  glucagon  Injectable PRN  heparin  Injectable  insulin lispro (HumaLOG) corrective regimen sliding scale  insulin lispro (HumaLOG) corrective regimen sliding scale  lactobacillus acidophilus  pantoprazole    Tablet  predniSONE   Tablet  tiotropium 18 MICROgram(s) Capsule      Review of Systems--  Review of systems unable to be obtained secondary to clinical condition.    Physical Examination--    Vital Signs: T(F): 98.1 (03-06-19 @ 05:32), Max: 99.4 (03-05-19 @ 14:03)  HR: 76 (03-06-19 @ 07:40)  BP: 145/76 (03-06-19 @ 05:32)  RR: 18 (03-06-19 @ 05:32)  SpO2: 96% (03-06-19 @ 07:40)  Wt(kg): --  General: Nontoxic-appearing Female in no acute distress.  HEENT: AT/NC. PERRL. EOMI. Anicteric. Conjunctiva pink and moist. Oropharynx clear. Dentition fair.  Neck: Not rigid. No sense of mass.  Nodes: None palpable.  Lungs: Coarse breath sounds  bilaterally with rhonchi  Heart: Regular rate and rhythm. No Murmur. No rub. No gallop. No palpable thrill.  Abdomen: Bowel sounds present and normoactive. Soft. Nondistended. Nontender. No sense of mass. No organomegaly.  Back: No spinal tenderness. No costovertebral angle tenderness.   Extremities: No cyanosis or clubbing. No edema.   Skin: Warm. Dry. Good turgor. No rash. No vasculitic stigmata.  Psychiatric: Appropriate affect and mood for situation.         Laboratory Studies--  CBC                        13.8   4.93  )-----------( 376      ( 04 Mar 2019 18:59 )             41.6       Chemistries  03-04    132<L>  |  100  |  11  ----------------------------<  98  4.4   |  25  |  1.00    Ca    7.6<L>      04 Mar 2019 19:49    TPro  5.9<L>  /  Alb  3.0<L>  /  TBili  0.3  /  DBili  x   /  AST  15  /  ALT  20  /  AlkPhos  52  03-04    Procalcitonin, Serum (03.04.19 @ 19:49)    Procalcitonin, Serum: <0.05:    Sedimentation Rate, Erythrocyte (03.05.19 @ 14:48)    Sedimentation Rate, Erythrocyte: 18 mm/hr    C-Reactive Protein, Serum (03.06.19 @ 00:52)    C-Reactive Protein, Serum: 2.28 mg/dL    Lactate, Blood: 1.8 mmol/L (03-04-19 @ 18:54)    Culture Data    Rapid Respiratory Viral Panel (03.05.19 @ 20:22)    Rapid RVP Result: Detected: This Respiratory Panel uses polymerase chain reaction (PCR) to detect for  adenovirus; coronavirus (HKU1, NL63, 229E, OC43); human metapneumovirus  (hMPV); human enterovirus/rhinovirus (Entero/RV); influenza A; influenza  A/H1; influenza A/H3; influenza A/H1-2009; influenza B; parainfluenza  viruses 1, 2, 3, 4; respiratory syncytial virus; Mycoplasma pneumoniae;  and Chlamydophila pneumoniae.    hMPV (RapRVP): Detected        Culture - Urine (collected 04 Mar 2019 23:09)  Source: .Urine Clean Catch (Midstream)  Final Report (05 Mar 2019 17:39):    <10,000 CFU/ml    Normal Urogenital darnell present    Culture - Blood (collected 04 Mar 2019 22:22)  Source: .Blood Blood-Peripheral  Preliminary Report (05 Mar 2019 23:01):    No growth to date.    Culture - Blood (collected 04 Mar 2019 22:22)  Source: .Blood Blood-Peripheral  Preliminary Report (05 Mar 2019 23:01):    No growth to date.        Radiology:  CT Chest w/ IV Cont (03.04.19 @ 20:45) >  FINDINGS:    LUNGS AND LARGE AIRWAYS: Patchy consolidation in the right upper lobe   with additional patchy opacities in the remaining lungs, particularly in   the right middle lobe and both lower lobes.  PLEURA: No pleural effusion.  VESSELS: Atherosclerotic calcifications.   HEART: Heart size is normal. No pericardial effusion.  MEDIASTINUM AND MUKESH: Mediastinal adenopathy. For example:  *  Prevascular node (series 2, image 26), measuring 1.4 x 1.3 cm  *  Right paratracheal node (series 2, image 34), measuring 1.7 x 1.6 cm  CHEST WALL AND LOWER NECK: Within normal limits.  VISUALIZED UPPER ABDOMEN: Increased size of cyst in the caudate lobe   (series 2, image 89), measuring 5.2 x 3.6 cm, previously 3.2 x 2.0 cm.   Possible left renal cyst.  BONES: Degenerative changes of the spine.    IMPRESSION:     Multifocal pneumonia.    Mediastinal adenopathy, probably reactive. Follow-up to complete   resolution is recommended to exclude underlying neoplasm.    Xray Chest 2 Views PA/Lat (03.04.19 @ 19:10) >  LUNGS/PLEURA: New consolidation in the right upper lobe. No pleural   effusion or pneumothorax.  MEDIASTINUM: Cardiomediastinal silhouette is unremarkable.  OTHER: None.    IMPRESSION:     New consolidation in the right upper lobe, possibly pneumonia in the   appropriate clinical setting. Follow-up to complete resolution is   recommended to exclude underlying neoplasm.      Assessment :     CHIDI WELLS is a 67 YO Female hx of asthma, HTN, type 2 DM brought to ER complaining of productive cough and fever intermittent for about 3 weeks associated with fever no night sweats or weight loss or hemoptysis. has 2 birds atr home feels her asthma has gotten worse after the birds came,    CT chest shows bilateral nodular infiltrates . Need to be concerned about Atypical pneumonia like Psittacosis. Also should consider hypersensitivity pneumonia     Plan :   - will continue with contact precautions for HMPV  - will change to po Doxycycline , cont Levaquin  - repeat CXR in 24 hours   - she will need follow up ct chest in 4-6 weeks as she may need bronchoscopy if no improvement    - obtain records form pulmonary office and review [facility]    - increase activity   - pulse oxymetry     Continue with present regime .  Appropriate use of antibiotics and adverse effects reviewed.    I have discussed the above plan of care with patient and  her daughter in detail. They expressed understanding of the treatment plan . Risks, benefits and alternatives discussed in detail. I have asked if they have any questions or concerns and appropriately addressed them to the best of my ability .      > 35 minutes spent in direct patient care reviewing  the notes, lab data/ imaging , discussion with multidisciplinary team. All questions were addressed and answered to the best of my capacity .    Thank you for allowing me to participate in the care of your patient .        Sidra Theodore MD  [phone]

## 2019-03-06 NOTE — PHYSICAL THERAPY INITIAL EVALUATION ADULT - PERTINENT HX OF CURRENT PROBLEM, REHAB EVAL
CHIDI WELLS is a 69 YO Female brought to ER complaining of productive cough and fever intermittent for about 1 week. .  Patient has history of asthma, HTN, HLD & DM.  Patient was seen by her PCP Dr. Buckley, and prescribed antibiotics last week with no improvement of symptoms.  Fever at home 105 degree Farenheit.  No nausea or vomiting.  No chest pain.

## 2019-03-07 ENCOUNTER — TRANSCRIPTION ENCOUNTER (OUTPATIENT)
Age: 69
End: 2019-03-07

## 2019-03-07 VITALS — OXYGEN SATURATION: 95 %

## 2019-03-07 DIAGNOSIS — J18.9 PNEUMONIA, UNSPECIFIED ORGANISM: ICD-10-CM

## 2019-03-07 LAB
A FUMIGATUS IGG SER QL: 8.1 MCG/ML — SIGNIFICANT CHANGE UP
ALTERNARIA TENUIS/ALTERNATA IGG: <2 MCG/ML — SIGNIFICANT CHANGE UP
ANION GAP SERPL CALC-SCNC: 8 MMOL/L — SIGNIFICANT CHANGE UP (ref 5–17)
AUREOBASIDIUM PULLULANS IGG: <2 MCG/ML — SIGNIFICANT CHANGE UP
BASOPHILS # BLD AUTO: 0.01 K/UL — SIGNIFICANT CHANGE UP (ref 0–0.2)
BASOPHILS NFR BLD AUTO: 0.1 % — SIGNIFICANT CHANGE UP (ref 0–2)
BUN SERPL-MCNC: 13 MG/DL — SIGNIFICANT CHANGE UP (ref 7–23)
CALCIUM SERPL-MCNC: 8.4 MG/DL — LOW (ref 8.5–10.1)
CHLORIDE SERPL-SCNC: 100 MMOL/L — SIGNIFICANT CHANGE UP (ref 96–108)
CO2 SERPL-SCNC: 26 MMOL/L — SIGNIFICANT CHANGE UP (ref 22–31)
CREAT SERPL-MCNC: 0.88 MG/DL — SIGNIFICANT CHANGE UP (ref 0.5–1.3)
EOSINOPHIL # BLD AUTO: 0.01 K/UL — SIGNIFICANT CHANGE UP (ref 0–0.5)
EOSINOPHIL NFR BLD AUTO: 0.1 % — SIGNIFICANT CHANGE UP (ref 0–6)
GAMMA INTERFERON BACKGROUND BLD IA-ACNC: 0.06 IU/ML — SIGNIFICANT CHANGE UP
GLUCOSE SERPL-MCNC: 151 MG/DL — HIGH (ref 70–99)
HCT VFR BLD CALC: 35.6 % — SIGNIFICANT CHANGE UP (ref 34.5–45)
HGB BLD-MCNC: 11.9 G/DL — SIGNIFICANT CHANGE UP (ref 11.5–15.5)
IMM GRANULOCYTES NFR BLD AUTO: 0.5 % — SIGNIFICANT CHANGE UP (ref 0–1.5)
LEGIONELLA AG UR QL: NEGATIVE — SIGNIFICANT CHANGE UP
LYMPHOCYTES # BLD AUTO: 1.71 K/UL — SIGNIFICANT CHANGE UP (ref 1–3.3)
LYMPHOCYTES # BLD AUTO: 11 % — LOW (ref 13–44)
M TB IFN-G BLD-IMP: ABNORMAL
M TB IFN-G CD4+ BCKGRND COR BLD-ACNC: 0 IU/ML — SIGNIFICANT CHANGE UP
M TB IFN-G CD4+CD8+ BCKGRND COR BLD-ACNC: 0.01 IU/ML — SIGNIFICANT CHANGE UP
MCHC RBC-ENTMCNC: 27.2 PG — SIGNIFICANT CHANGE UP (ref 27–34)
MCHC RBC-ENTMCNC: 33.4 GM/DL — SIGNIFICANT CHANGE UP (ref 32–36)
MCV RBC AUTO: 81.3 FL — SIGNIFICANT CHANGE UP (ref 80–100)
MICROPOLYSPORA FAENI IGG: <2 MCG/ML — SIGNIFICANT CHANGE UP
MONOCYTES # BLD AUTO: 0.95 K/UL — HIGH (ref 0–0.9)
MONOCYTES NFR BLD AUTO: 6.1 % — SIGNIFICANT CHANGE UP (ref 2–14)
NEUTROPHILS # BLD AUTO: 12.83 K/UL — HIGH (ref 1.8–7.4)
NEUTROPHILS NFR BLD AUTO: 82.2 % — HIGH (ref 43–77)
NRBC # BLD: 0 /100 WBCS — SIGNIFICANT CHANGE UP (ref 0–0)
PENICILLIUM CHRYSOGENUM/NOTATUM IGG: 7.2 MCG/ML — SIGNIFICANT CHANGE UP
PHOMA BETAE IGG: <2 MCG/ML — SIGNIFICANT CHANGE UP
PLATELET # BLD AUTO: 406 K/UL — HIGH (ref 150–400)
POTASSIUM SERPL-MCNC: 4.2 MMOL/L — SIGNIFICANT CHANGE UP (ref 3.5–5.3)
POTASSIUM SERPL-SCNC: 4.2 MMOL/L — SIGNIFICANT CHANGE UP (ref 3.5–5.3)
QUANT TB PLUS MITOGEN MINUS NIL: 0.31 IU/ML — SIGNIFICANT CHANGE UP
RBC # BLD: 4.38 M/UL — SIGNIFICANT CHANGE UP (ref 3.8–5.2)
RBC # FLD: 15.2 % — HIGH (ref 10.3–14.5)
SODIUM SERPL-SCNC: 134 MMOL/L — LOW (ref 135–145)
T VULGARIS IGG SER QL: <2 MCG/ML — SIGNIFICANT CHANGE UP
TRICHODERMA VIRIDE IGG: <2 MCG/ML — SIGNIFICANT CHANGE UP
WBC # BLD: 15.59 K/UL — HIGH (ref 3.8–10.5)
WBC # FLD AUTO: 15.59 K/UL — HIGH (ref 3.8–10.5)

## 2019-03-07 PROCEDURE — 87040 BLOOD CULTURE FOR BACTERIA: CPT

## 2019-03-07 PROCEDURE — 82785 ASSAY OF IGE: CPT

## 2019-03-07 PROCEDURE — 87633 RESP VIRUS 12-25 TARGETS: CPT

## 2019-03-07 PROCEDURE — 71046 X-RAY EXAM CHEST 2 VIEWS: CPT

## 2019-03-07 PROCEDURE — 99285 EMERGENCY DEPT VISIT HI MDM: CPT | Mod: 25

## 2019-03-07 PROCEDURE — 94760 N-INVAS EAR/PLS OXIMETRY 1: CPT

## 2019-03-07 PROCEDURE — 94640 AIRWAY INHALATION TREATMENT: CPT

## 2019-03-07 PROCEDURE — 86001 ALLERGEN SPECIFIC IGG: CPT

## 2019-03-07 PROCEDURE — 86403 PARTICLE AGGLUT ANTBDY SCRN: CPT

## 2019-03-07 PROCEDURE — 86803 HEPATITIS C AB TEST: CPT

## 2019-03-07 PROCEDURE — 81001 URINALYSIS AUTO W/SCOPE: CPT

## 2019-03-07 PROCEDURE — 36415 COLL VENOUS BLD VENIPUNCTURE: CPT

## 2019-03-07 PROCEDURE — 80048 BASIC METABOLIC PNL TOTAL CA: CPT

## 2019-03-07 PROCEDURE — 87798 DETECT AGENT NOS DNA AMP: CPT

## 2019-03-07 PROCEDURE — 93005 ELECTROCARDIOGRAM TRACING: CPT

## 2019-03-07 PROCEDURE — 83605 ASSAY OF LACTIC ACID: CPT

## 2019-03-07 PROCEDURE — 99221 1ST HOSP IP/OBS SF/LOW 40: CPT

## 2019-03-07 PROCEDURE — 87899 AGENT NOS ASSAY W/OPTIC: CPT

## 2019-03-07 PROCEDURE — 80053 COMPREHEN METABOLIC PANEL: CPT

## 2019-03-07 PROCEDURE — 85652 RBC SED RATE AUTOMATED: CPT

## 2019-03-07 PROCEDURE — 83036 HEMOGLOBIN GLYCOSYLATED A1C: CPT

## 2019-03-07 PROCEDURE — 71045 X-RAY EXAM CHEST 1 VIEW: CPT

## 2019-03-07 PROCEDURE — 86631 CHLAMYDIA ANTIBODY: CPT

## 2019-03-07 PROCEDURE — 87086 URINE CULTURE/COLONY COUNT: CPT

## 2019-03-07 PROCEDURE — 96374 THER/PROPH/DIAG INJ IV PUSH: CPT

## 2019-03-07 PROCEDURE — 71260 CT THORAX DX C+: CPT

## 2019-03-07 PROCEDURE — 86480 TB TEST CELL IMMUN MEASURE: CPT

## 2019-03-07 PROCEDURE — 97116 GAIT TRAINING THERAPY: CPT

## 2019-03-07 PROCEDURE — 71045 X-RAY EXAM CHEST 1 VIEW: CPT | Mod: 26

## 2019-03-07 PROCEDURE — 87486 CHLMYD PNEUM DNA AMP PROBE: CPT

## 2019-03-07 PROCEDURE — 85027 COMPLETE CBC AUTOMATED: CPT

## 2019-03-07 PROCEDURE — 87581 M.PNEUMON DNA AMP PROBE: CPT

## 2019-03-07 PROCEDURE — 82962 GLUCOSE BLOOD TEST: CPT

## 2019-03-07 PROCEDURE — 87449 NOS EACH ORGANISM AG IA: CPT

## 2019-03-07 PROCEDURE — 86140 C-REACTIVE PROTEIN: CPT

## 2019-03-07 PROCEDURE — 97162 PT EVAL MOD COMPLEX 30 MIN: CPT

## 2019-03-07 PROCEDURE — 87631 RESP VIRUS 3-5 TARGETS: CPT

## 2019-03-07 PROCEDURE — 84145 PROCALCITONIN (PCT): CPT

## 2019-03-07 RX ORDER — VALACYCLOVIR 500 MG/1
1 TABLET, FILM COATED ORAL
Qty: 4 | Refills: 0
Start: 2019-03-07

## 2019-03-07 RX ORDER — PANTOPRAZOLE SODIUM 20 MG/1
1 TABLET, DELAYED RELEASE ORAL
Qty: 30 | Refills: 0
Start: 2019-03-07 | End: 2019-04-05

## 2019-03-07 RX ORDER — GABAPENTIN 400 MG/1
1 CAPSULE ORAL
Qty: 0 | Refills: 0 | DISCHARGE
Start: 2019-03-07

## 2019-03-07 RX ORDER — METFORMIN HYDROCHLORIDE 850 MG/1
1 TABLET ORAL
Qty: 0 | Refills: 0 | DISCHARGE
Start: 2019-03-07

## 2019-03-07 RX ORDER — METFORMIN HYDROCHLORIDE 850 MG/1
0 TABLET ORAL
Qty: 0 | Refills: 0 | COMMUNITY

## 2019-03-07 RX ORDER — DILTIAZEM HCL 120 MG
1 CAPSULE, EXT RELEASE 24 HR ORAL
Qty: 0 | Refills: 0 | DISCHARGE
Start: 2019-03-07

## 2019-03-07 RX ORDER — VALACYCLOVIR 500 MG/1
1000 TABLET, FILM COATED ORAL
Qty: 0 | Refills: 0 | Status: DISCONTINUED | OUTPATIENT
Start: 2019-03-07 | End: 2019-03-07

## 2019-03-07 RX ORDER — FLUTICASONE PROPIONATE AND SALMETEROL 50; 250 UG/1; UG/1
1 POWDER ORAL; RESPIRATORY (INHALATION)
Qty: 1 | Refills: 0
Start: 2019-03-07 | End: 2019-04-05

## 2019-03-07 RX ORDER — CIPROFLOXACIN LACTATE 400MG/40ML
1 VIAL (ML) INTRAVENOUS
Qty: 3 | Refills: 0
Start: 2019-03-07 | End: 2019-03-09

## 2019-03-07 RX ORDER — LACTOBACILLUS ACIDOPHILUS 100MM CELL
0 CAPSULE ORAL
Qty: 0 | Refills: 0 | DISCHARGE
Start: 2019-03-07

## 2019-03-07 RX ORDER — DILTIAZEM HCL 120 MG
0 CAPSULE, EXT RELEASE 24 HR ORAL
Qty: 0 | Refills: 0 | COMMUNITY

## 2019-03-07 RX ORDER — CIPROFLOXACIN LACTATE 400MG/40ML
1 VIAL (ML) INTRAVENOUS
Qty: 7 | Refills: 0 | OUTPATIENT
Start: 2019-03-07 | End: 2019-03-13

## 2019-03-07 RX ORDER — DIPHENHYDRAMINE HYDROCHLORIDE AND LIDOCAINE HYDROCHLORIDE AND ALUMINUM HYDROXIDE AND MAGNESIUM HYDRO
10 KIT THREE TIMES A DAY
Qty: 0 | Refills: 0 | Status: DISCONTINUED | OUTPATIENT
Start: 2019-03-07 | End: 2019-03-07

## 2019-03-07 RX ORDER — ALBUTEROL 90 UG/1
0 AEROSOL, METERED ORAL
Qty: 0 | Refills: 0 | COMMUNITY

## 2019-03-07 RX ADMIN — HEPARIN SODIUM 5000 UNIT(S): 5000 INJECTION INTRAVENOUS; SUBCUTANEOUS at 05:48

## 2019-03-07 RX ADMIN — Medication 1 TABLET(S): at 08:19

## 2019-03-07 RX ADMIN — Medication 1 TABLET(S): at 17:30

## 2019-03-07 RX ADMIN — Medication 2: at 17:29

## 2019-03-07 RX ADMIN — HEPARIN SODIUM 5000 UNIT(S): 5000 INJECTION INTRAVENOUS; SUBCUTANEOUS at 14:13

## 2019-03-07 RX ADMIN — Medication 100 MILLIGRAM(S): at 17:29

## 2019-03-07 RX ADMIN — Medication 0.5 MILLIGRAM(S): at 07:53

## 2019-03-07 RX ADMIN — Medication 40 MILLIGRAM(S): at 17:29

## 2019-03-07 RX ADMIN — Medication 3: at 12:22

## 2019-03-07 RX ADMIN — Medication 3 MILLILITER(S): at 14:02

## 2019-03-07 RX ADMIN — Medication 1 TABLET(S): at 12:23

## 2019-03-07 RX ADMIN — VALACYCLOVIR 1000 MILLIGRAM(S): 500 TABLET, FILM COATED ORAL at 17:31

## 2019-03-07 RX ADMIN — Medication 120 MILLIGRAM(S): at 05:47

## 2019-03-07 RX ADMIN — Medication 3 MILLILITER(S): at 07:53

## 2019-03-07 RX ADMIN — VALACYCLOVIR 1000 MILLIGRAM(S): 500 TABLET, FILM COATED ORAL at 12:23

## 2019-03-07 RX ADMIN — Medication 2.5 MILLIGRAM(S): at 05:47

## 2019-03-07 RX ADMIN — Medication 600 MILLIGRAM(S): at 05:48

## 2019-03-07 RX ADMIN — Medication 50 MILLIGRAM(S): at 05:47

## 2019-03-07 RX ADMIN — Medication 100 MILLIGRAM(S): at 05:47

## 2019-03-07 RX ADMIN — Medication 600 MILLIGRAM(S): at 17:30

## 2019-03-07 RX ADMIN — GABAPENTIN 100 MILLIGRAM(S): 400 CAPSULE ORAL at 05:47

## 2019-03-07 RX ADMIN — DIPHENHYDRAMINE HYDROCHLORIDE AND LIDOCAINE HYDROCHLORIDE AND ALUMINUM HYDROXIDE AND MAGNESIUM HYDRO 10 MILLILITER(S): KIT at 14:11

## 2019-03-07 RX ADMIN — GABAPENTIN 100 MILLIGRAM(S): 400 CAPSULE ORAL at 17:30

## 2019-03-07 RX ADMIN — PANTOPRAZOLE SODIUM 40 MILLIGRAM(S): 20 TABLET, DELAYED RELEASE ORAL at 05:49

## 2019-03-07 NOTE — DISCHARGE NOTE PROVIDER - HOSPITAL COURSE
admitted for SOB / Dyspnea    found to have PNA    ABX per ID    also has viral bronchitis    DC after ID and pulm clearance admitted for SOB / Dyspnea    found to have PNA    ABX per ID    also has viral bronchitis    also has asthma exacerbation    DC after ID and pulm clearance admitted for SOB / Dyspnea    found to have PNA    ABX per ID    also has viral bronchitis    also has asthma exacerbation    responded well to nebs and steroids    DC after ID and pulm clearance

## 2019-03-07 NOTE — DISCHARGE NOTE NURSING/CASE MANAGEMENT/SOCIAL WORK - NSDCDPATPORTLINK_GEN_ALL_CORE
You can access the [facility], offered by [facility], by registering with the following website: [email or web address]

## 2019-03-07 NOTE — DISCHARGE NOTE PROVIDER - CARE PROVIDER_API CALL
Cristel Buckley)  Internal Medicine  [address] [place] [address]  Phone: [phone]  Fax: [phone]  Follow Up Time:     Sidra Theodore)  Infectious Disease; Internal Medicine  [address] [place] [address]  Phone: [phone]  Fax: [phone]  Follow Up Time:     Kye Anderson)  Internal Medicine; Pulmonary Disease  [address] [place] [address] [address]  [place] [address]  Phone: [phone]  Fax: [phone]  Follow Up Time:

## 2019-03-07 NOTE — DISCHARGE NOTE PROVIDER - NSDCCPCAREPLAN_GEN_ALL_CORE_FT
PRINCIPAL DISCHARGE DIAGNOSIS  Problem: Pneumonia of both lungs due to infectious organism, unspecified part of lung  Assessment and Plan of Treatment: breath better

## 2019-03-07 NOTE — DISCHARGE NOTE PROVIDER - PROVIDER TOKENS
PROVIDER:[TOKEN:[[id number]]],PROVIDER:[TOKEN:[[id number]]],PROVIDER:[TOKEN:[[id number]]]

## 2019-03-07 NOTE — PROGRESS NOTE ADULT - SUBJECTIVE AND OBJECTIVE BOX
ID progress note     Name: CHIDI WELLS  Age: 68y  Gender: Female  MRN: [id number]    Interval History-- Events noted complaints of painful ulcers on tongue and throat , still with cough , afebrile   Notes reviewed    Past Medical History--  Asthma  HTN (hypertension)  DM (diabetes mellitus)  No significant past surgical history      For details regarding the patient's social history, family history, and other miscellaneous elements, please refer the initial infectious diseases consultation and/or the admitting history and physical examination for this admission.    Allergies--  Allergies    penicillin (Unknown)    Intolerances        Medications--  Antibiotics:  doxycycline hyclate Capsule 100 milliGRAM(s) Oral every 12 hours  levoFLOXacin  Tablet 750 milliGRAM(s) Oral every 24 hours    Immunologic:    Other:  ALBUTerol    90 MICROgram(s) HFA Inhaler  ALBUTerol/ipratropium for Nebulization  buDESOnide   0.5 milliGRAM(s) Respule  dextrose 40% Gel PRN  dextrose 5%.  dextrose 50% Injectable  dextrose 50% Injectable  dextrose 50% Injectable  diltiazem   CD  enalapril  gabapentin  glucagon  Injectable PRN  guaiFENesin ER  heparin  Injectable  insulin lispro (HumaLOG) corrective regimen sliding scale  insulin lispro (HumaLOG) corrective regimen sliding scale  lactobacillus acidophilus  pantoprazole    Tablet  predniSONE   Tablet  tiotropium 18 MICROgram(s) Capsule      Review of Systems--  A 10-point review of systems was obtained.     Pertinent positives and negatives--  Constitutional: No fevers. No Chills. No Rigors.   Cardiovascular: No chest pain. No palpitations.  Respiratory: Pos for  shortness of breath and  cough.  Gastrointestinal: No nausea or vomiting. No diarrhea or constipation.   Psychiatric: Pleasant. Appropriate affect.    Review of systems otherwise negative except as previously noted.    Physical Examination--  Vital Signs: T(F): 98.4 (03-07-19 @ 05:48), Max: 98.6 (03-06-19 @ 13:31)  HR: 81 (03-07-19 @ 07:58)  BP: 166/75 (03-07-19 @ 05:48)  RR: 18 (03-07-19 @ 05:48)  SpO2: 98% (03-07-19 @ 07:58)  Wt(kg): --  General: Nontoxic-appearing Female in no acute distress.  HEENT: AT/NC. PERRL. EOMI. Anicteric. Conjunctiva pink and moist. Oropharynx small ulcers on tongue and palate . Dentition fair.  Neck: Not rigid. No sense of mass.  Nodes: None palpable.  Lungs: Coarse breath sounds  bilaterally without rales, wheezing or rhonchi  Heart: Regular rate and rhythm. No Murmur. No rub. No gallop. No palpable thrill.  Abdomen: Bowel sounds present and normoactive. Soft. Nondistended. Nontender. No sense of mass. No organomegaly.  Back: No spinal tenderness. No costovertebral angle tenderness.   Extremities: No cyanosis or clubbing. No edema.   Skin: Warm. Dry. Good turgor. No rash. No vasculitic stigmata.  Psychiatric: Appropriate affect and mood for situation.         Laboratory Studies--  CBC                        11.9   15.59 )-----------( 406      ( 07 Mar 2019 07:16 )             35.6       Chemistries  03-07    134<L>  |  100  |  13  ----------------------------<  151<H>  4.2   |  26  |  0.88    Ca    8.4<L>      07 Mar 2019 07:16        Culture Data  Rapid Respiratory Viral Panel (03.05.19 @ 20:22)    Rapid RVP Result: Detected:     hMPV (RapRVP): Detected    Culture - Urine (collected 04 Mar 2019 23:09)  Source: .Urine Clean Catch (Midstream)  Final Report (05 Mar 2019 17:39):    <10,000 CFU/ml    Normal Urogenital darnell present    Culture - Blood (collected 04 Mar 2019 22:22)  Source: .Blood Blood-Peripheral  Preliminary Report (05 Mar 2019 23:01):    No growth to date.    Culture - Blood (collected 04 Mar 2019 22:22)  Source: .Blood Blood-Peripheral  Preliminary Report (05 Mar 2019 23:01):    No growth to date.    RADIOLOGY:  CT Chest w/ IV Cont (03.04.19 @ 20:45) >  FINDINGS:    LUNGS AND LARGE AIRWAYS: Patchy consolidation in the right upper lobe   with additional patchy opacities in the remaining lungs, particularly in   the right middle lobe and both lower lobes.  PLEURA: No pleural effusion.  VESSELS: Atherosclerotic calcifications.   HEART: Heart size is normal. No pericardial effusion.  MEDIASTINUM AND MUKESH: Mediastinal adenopathy. For example:  *  Prevascular node (series 2, image 26), measuring 1.4 x 1.3 cm  *  Right paratracheal node (series 2, image 34), measuring 1.7 x 1.6 cm  CHEST WALL AND LOWER NECK: Within normal limits.  VISUALIZED UPPER ABDOMEN: Increased size of cyst in the caudate lobe   (series 2, image 89), measuring 5.2 x 3.6 cm, previously 3.2 x 2.0 cm.   Possible left renal cyst.  BONES: Degenerative changes of the spine.    IMPRESSION:     Multifocal pneumonia.    Mediastinal adenopathy, probably reactive. Follow-up to complete   resolution is recommended to exclude underlying neoplasm.    Xray Chest 2 Views PA/Lat (03.04.19 @ 19:10) >  LUNGS/PLEURA: New consolidation in the right upper lobe. No pleural   effusion or pneumothorax.  MEDIASTINUM: Cardiomediastinal silhouette is unremarkable.  OTHER: None.    IMPRESSION:     New consolidation in the right upper lobe, possibly pneumonia in the   appropriate clinical setting. Follow-up to complete resolution is   recommended to exclude underlying neoplasm.      Assessment :   CHIDI WELLS is a 67 YO Female hx of asthma, HTN, type 2 DM brought to ER complaining of productive cough and fever intermittent for about 3 weeks associated with fever no night sweats or weight loss or hemoptysis. has 2 birds atr home feels her asthma has gotten worse after the birds came,    CT chest shows bilateral nodular infiltrates . Need to be concerned about Atypical pneumonia like Psittacosis. Also should consider hypersensitivity pneumonia     Plan :   - will continue with contact precautions for HMPV  - will change to po Doxycycline dc Levaquin  - repeat CXR today   - add magik mouth qid and Valtrex 1 gm bid x 4 doses    - she will need follow up ct chest in 4-6 weeks as she may need bronchoscopy if no improvement    - obtain records form pulmonary office and review old cxr from [facility]    - increase activity   - pulse oxymetry     Continue with present regime .  Appropriate use of antibiotics and adverse effects reviewed.    I have discussed the above plan of care with patient and family in detail. They expressed understanding of the treatment plan . Risks, benefits and alternatives discussed in detail. I have asked if they have any questions or concerns and appropriately addressed them to the best of my ability .      > 35 minutes spent in direct patient care reviewing  the notes, lab data/ imaging , discussion with multidisciplinary team. All questions were addressed and answered to the best of my capacity .    Thank you for allowing me to participate in the care of your patient .        Sidra Theodore MD  [phone]

## 2019-03-08 NOTE — CHART NOTE - NSCHARTNOTEFT_GEN_A_CORE
Do you have Advance Directives (HCP / LV / Organ donation / Documentation of oral advance Directive):   (  x  )  yes    (      )    NO                                                                            Do you have LV - Living will :                                                                                                                                             (    )  yes    (   x   )   No    Do you have HCP - Health Care Proxy:                                                                                                                            (  x   )  yes   (       ) N0    Do you have DNR- Do Not Resuscitate :                                                                                                                           (      )  yes  (    x    )  No    Do you have DNI- Do Not intubate  :                                                                                                                               (      )  yes   (    x   ) No    Do you have MOLST - Medical orders for Life sustaining treatment  :                                                                    (      ) yes    (   x    ) No    Decision Maker :  (  x   ) Patient     (      )  HCA   (     ) Public Health Law Surrogate     (      ) Surrogate  (       ) Guardian    Goals of Care :  (   x   )   Complete Care     (       ) No Limitations                              (       )   Comfort Care       (       )  Hospice                               (      )   Limited medical Intervention / s    Medical Interventions :   (   x     )   CPR       (        )  DNR                                               (    x    )  Intubation with MV - Mechanical Ventilation  (    x  ) BIPAP/CPAP    (         )   DNI                                               (     x    )  Artificial Nutrition -  IVF, TPN / PPN, Tube Feeds             (         )   No Feeding Tube                                                (   x     ) Use Antibiotics                         (          ) No Antibiotics                                                (     x    ) Blood and Blood Products     (         )   No Blood or Blood products                                                (     x     )  Dialysis                                    (         )  No Dialysis                                                (          )  Medical Management only  (         )  No Invasive Interventions or Surgery  Time spent :                        (   x    ) up to 30 minutes                       (           )   more than 30 minutes  Goals of care reviewed and discussed

## 2019-03-09 LAB
CULTURE RESULTS: SIGNIFICANT CHANGE UP
CULTURE RESULTS: SIGNIFICANT CHANGE UP
S PNEUM AG UR QL: NEGATIVE — SIGNIFICANT CHANGE UP
SPECIMEN SOURCE: SIGNIFICANT CHANGE UP
SPECIMEN SOURCE: SIGNIFICANT CHANGE UP

## 2019-03-11 DIAGNOSIS — Z87.19 PERSONAL HISTORY OF OTHER DISEASES OF THE DIGESTIVE SYSTEM: ICD-10-CM

## 2019-03-11 LAB
C PNEUM IGM TITR SER: SIGNIFICANT CHANGE UP
CHLAMYDIA AB SER-ACNC: SIGNIFICANT CHANGE UP
CHLAMYDIA IGG SER-ACNC: HIGH
CHLAMYDIA PNEUMONIAE IGA: SIGNIFICANT CHANGE UP

## 2019-03-11 RX ORDER — FLUTICASONE PROPIONATE AND SALMETEROL 50; 250 UG/1; UG/1
250-50 POWDER RESPIRATORY (INHALATION) TWICE DAILY
Refills: 0 | Status: ACTIVE | COMMUNITY
Start: 2019-03-11

## 2019-05-01 PROBLEM — I10 ESSENTIAL (PRIMARY) HYPERTENSION: Chronic | Status: ACTIVE | Noted: 2019-03-04

## 2019-05-01 PROBLEM — E11.9 TYPE 2 DIABETES MELLITUS WITHOUT COMPLICATIONS: Chronic | Status: ACTIVE | Noted: 2019-03-04

## 2019-05-21 ENCOUNTER — APPOINTMENT (OUTPATIENT)
Dept: OTOLARYNGOLOGY | Facility: CLINIC | Age: 69
End: 2019-05-21
Payer: MEDICARE

## 2019-05-21 VITALS
DIASTOLIC BLOOD PRESSURE: 75 MMHG | SYSTOLIC BLOOD PRESSURE: 129 MMHG | HEART RATE: 75 BPM | HEIGHT: 62 IN | WEIGHT: 140 LBS | BODY MASS INDEX: 25.76 KG/M2

## 2019-05-21 PROCEDURE — 99214 OFFICE O/P EST MOD 30 MIN: CPT

## 2019-05-21 RX ORDER — LEVOFLOXACIN 750 MG/1
750 TABLET, FILM COATED ORAL DAILY
Qty: 3 | Refills: 0 | Status: COMPLETED | COMMUNITY
Start: 2019-03-11 | End: 2019-05-21

## 2019-05-21 RX ORDER — DOXYCYCLINE HYCLATE 100 MG/1
100 CAPSULE ORAL
Refills: 0 | Status: COMPLETED | COMMUNITY
Start: 2019-03-11 | End: 2019-05-21

## 2019-05-21 RX ORDER — GABAPENTIN 100 MG/1
100 CAPSULE ORAL TWICE DAILY
Refills: 0 | Status: COMPLETED | COMMUNITY
Start: 2019-03-11 | End: 2019-05-21

## 2019-05-21 RX ORDER — METHYLPREDNISOLONE 4 MG/1
4 TABLET ORAL
Refills: 1 | Status: COMPLETED | COMMUNITY
Start: 2019-03-11 | End: 2019-05-21

## 2019-05-28 ENCOUNTER — APPOINTMENT (OUTPATIENT)
Dept: OTOLARYNGOLOGY | Facility: CLINIC | Age: 69
End: 2019-05-28

## 2019-06-27 NOTE — PHYSICAL EXAM
[Midline] : trachea located in midline position [Normal] : orientation to person, place, and time: normal [de-identified] : R. tail of parotid mass, approx. 3-4 cm, slight TTP, no other lesions, no LAD.  There is a smaller, approx. 1-1.5 mass in the left parotid, mobile, no TTP.

## 2019-06-27 NOTE — HISTORY OF PRESENT ILLNESS
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 No recent imaging. PT c/o recent increase in swelling and tenderness on R parotid.  PT also has new swelling and pain on L neck area which fluctuates.  Pt denies dysphonia, dysphagia, and otalgia.\par

## 2019-12-13 ENCOUNTER — APPOINTMENT (OUTPATIENT)
Dept: OTOLARYNGOLOGY | Facility: CLINIC | Age: 69
End: 2019-12-13
Payer: MEDICARE

## 2019-12-13 VITALS
WEIGHT: 140 LBS | BODY MASS INDEX: 25.76 KG/M2 | DIASTOLIC BLOOD PRESSURE: 72 MMHG | SYSTOLIC BLOOD PRESSURE: 127 MMHG | HEIGHT: 62 IN | HEART RATE: 79 BPM

## 2019-12-13 PROCEDURE — 99214 OFFICE O/P EST MOD 30 MIN: CPT

## 2019-12-13 NOTE — PHYSICAL EXAM
[Midline] : trachea located in midline position [Normal] : no rashes [de-identified] : R. tail of parotid mass, approx. 3-4 cm, slight TTP, no other lesions, no LAD.  There is a smaller, approx. 1-1.5 mass in the left parotid, mobile, no TTP.

## 2020-08-03 NOTE — PHYSICAL THERAPY INITIAL EVALUATION ADULT - PHYSICAL ASSIST/NONPHYSICAL ASSIST: SUPINE/SIT, REHAB EVAL
PROBLEM VISIT NOTE     Subjective:     Chief Complaint   Patient presents with    Chest Pain     Evgeny Delaney is a 78 y.o. female   who presents heaving feeling in her chest every day almost.  Upper chest. Non-exertional. Denies heartburn. Can last minutes. May be several times a day. No trigger. · She would every once in awhile feel like she was going to faint but would only last a couple seconds. Duration x 1 month but has increased to every day was every couple days   · She tried to donate blood at [facility] and her hemocrite was low in the 30's she wanted to mention that to you   · Only takes aleve occasionally. Patient's medications, allergies, past medical, and social histories were reviewed and updated as appropriate (see below). Review of Systems   General ROS: fever? No,    Night sweats? No  Endocrine ROS:malaise/lethargy? Yes   Unexpected weight changes? No  Respiratory ROS: cough? No   Shortness of breath? No  Cardiovascular ROS:chest pain? Yes   Shortness of breath with exertion? No  Gastrointestinal ROS: abdominal pain? No   Change in stools? No  Genito-Urinary ROS: painful urination? No   Trouble voiding? No  Neurological ROS: TIA or stroke symptoms? No   Numbness/tingling in feet? No  *Chief complaint, HPI, History and ROS provided by the medical assistant has been reviewed and verified by provider- Jeff Mcneill MD    HISTORY:  Patient's medications, allergies, past medical, and social histories were reviewed and updated as appropriate.      CHART REVIEW  Health Maintenance   Topic Date Due    Annual Wellness Visit (AWV)  05/29/2019    A1C test (Diabetic or Prediabetic)  06/27/2020    Flu vaccine (1) 09/01/2020    Diabetic foot exam  12/27/2020    Lipid screen  12/30/2020    Diabetic retinal exam  07/08/2021    DTaP/Tdap/Td vaccine (3 - Td) 06/11/2026    DEXA (modify frequency per FRAX score)  Completed    Shingles Vaccine  Completed    Pneumococcal 65+ years Vaccine Completed    Hepatitis A vaccine  Aged Out    Hib vaccine  Aged Out    Meningococcal (ACWY) vaccine  Aged Out     The ASCVD Risk score (Ismael Martin et al., 2013) failed to calculate for the following reasons: The 2013 ASCVD risk score is only valid for ages 36 to 78  Prior to Visit Medications    Medication Sig Taking? Authorizing Provider   metFORMIN (GLUCOPHAGE-XR) 500 MG extended release tablet Take 2 tablets by mouth 2 times daily Yes Юлия Bay MD   Fesoterodine Fumarate ER 8 MG TB24 Take 8 mg by mouth daily Yes Юлия Bay MD   atorvastatin (LIPITOR) 80 MG tablet Take 1 tablet by mouth daily Yes Юлия Bay MD   naproxen sodium (ALEVE) 220 MG tablet Take 550 mg by mouth 2 times daily (with meals) Yes Historical Provider, MD   CPAP Machine MISC by Does not apply route Yes Historical Provider, MD   solifenacin (VESICARE) 10 MG tablet Take 1 tablet by mouth daily. Юлия Bay MD      Family History   Problem Relation Age of Onset    Diabetes Mother     Cancer Mother         SCC, breast, colon polyp    Heart Disease Father     Heart Disease Brother     Cancer Brother         lung, liver     Social History     Tobacco Use    Smoking status: Never Smoker    Smokeless tobacco: Never Used    Tobacco comment: Advised not to start. Substance Use Topics    Alcohol use:  Yes     Alcohol/week: 0.0 standard drinks     Comment: light    Drug use: No      LAST LABS  Cholesterol, Total   Date Value Ref Range Status   12/30/2019 156 0 - 199 mg/dL Final     LDL Calculated   Date Value Ref Range Status   12/30/2019 54 <100 mg/dL Final     HDL   Date Value Ref Range Status   12/30/2019 90 (H) 40 - 60 mg/dL Final   10/19/2011 72 (H) 40 - 60 mg/dl Final     Triglycerides   Date Value Ref Range Status   12/30/2019 58 0 - 150 mg/dL Final     Lab Results   Component Value Date    GLUCOSE 150 (H) 12/30/2019     Lab Results   Component Value Date     12/30/2019    K 4.5 12/30/2019    CREATININE 0.7 12/30/2019     Lab Results   Component Value Date    WBC 5.4 09/25/2019    HGB 11.6 (L) 09/25/2019    HCT 35.6 (L) 09/25/2019    MCV 84.4 09/25/2019     09/25/2019     Lab Results   Component Value Date    ALT 15 12/30/2019    AST 16 12/30/2019    ALKPHOS 93 12/30/2019    BILITOT 1.0 12/30/2019     TSH (uIU/ml)   Date Value   01/30/2012 1.13     Lab Results   Component Value Date    LABA1C 7.8 12/27/2019      Objective:   PHYSICAL EXAM   /64 (Site: Right Upper Arm, Position: Sitting, Cuff Size: Large Adult)   Pulse 82   Temp 97.2 °F (36.2 °C) (Temporal)   Resp 16   Ht 5' 6\" (1.676 m)   Wt 164 lb (74.4 kg)   SpO2 98%   BMI 26.47 kg/m²   BP Readings from Last 5 Encounters:   08/03/20 120/64   12/27/19 122/84   11/19/19 138/70   11/08/19 (!) 136/94   09/25/19 128/74     Wt Readings from Last 5 Encounters:   08/03/20 164 lb (74.4 kg)   12/27/19 170 lb (77.1 kg)   11/19/19 171 lb (77.6 kg)   11/08/19 172 lb (78 kg)   09/25/19 167 lb (75.8 kg)      GENERAL:   · well-developed, well-nourished, alert, no distress. EYES:   · External findings: lids and lashes normal and conjunctivae and sclerae normal  LUNGS:    · Breathing unlabored  · clear to auscultation bilaterally and good air movement  CARDIOVASC:   · regular rate and rhythm, S1, S2 normal. No murmur, click, rub or gallop  · LEGS:  Lower extremity edema: none    SKIN: warm and dry  PSYCH:    · Alert and oriented  · Normal reasoning, insight good  · Facial expressions full, mood appropriate  · No memory disturbance noted    EKG: normal sinus rhythm, nonspecific ST and T waves changes, unchanged from previous tracings. No acute changes. Assessment and Plan:      Diagnosis Orders   1. Chest pain, unspecified type     2. Hyperlipidemia LDL goal <100     3. Type 2 diabetes mellitus without complication, without long-term current use of insulin ([facility]     4. Gastroesophageal reflux disease without esophagitis     5.  Generalized osteoarthritis supervision

## 2020-08-17 ENCOUNTER — APPOINTMENT (OUTPATIENT)
Dept: OTOLARYNGOLOGY | Facility: CLINIC | Age: 70
End: 2020-08-17
Payer: MEDICARE

## 2020-08-17 VITALS
SYSTOLIC BLOOD PRESSURE: 177 MMHG | HEIGHT: 62 IN | WEIGHT: 140 LBS | BODY MASS INDEX: 25.76 KG/M2 | DIASTOLIC BLOOD PRESSURE: 87 MMHG | HEART RATE: 93 BPM

## 2020-08-17 PROCEDURE — 99214 OFFICE O/P EST MOD 30 MIN: CPT

## 2020-08-17 NOTE — DATA REVIEWED
[de-identified] : US with stable bilateral parotid lesions.  The larger lesion on the right measures 3.78 x 1.95 x 3.63 cm.  The lesion on the left measures 1.53 x 1.21 cm.

## 2020-08-17 NOTE — HISTORY OF PRESENT ILLNESS
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  PT c/o R neck intermittent swelling and 2/10 pain, especially when touching the mass on the right side. Pain does not radiate and patient is not taking any medicine for pain. Pt denies dyspnea, dysphonia, dysphagia, and otalgia.  Her weight has been stable.\par

## 2020-08-17 NOTE — PHYSICAL EXAM
[Midline] : trachea located in midline position [Normal] : no rashes [de-identified] : R. tail of parotid mass, approx. 3-4 cm, slight TTP, no other lesions, no LAD.  There is a smaller, approx. 1-1.5 mass in the left parotid, mobile, no TTP.

## 2020-11-30 NOTE — H&P ADULT - GENERAL
Medication refill:    Medication Name: meloxicam (MOBIC) 15 MG tablet    Medication last refilled: 09/21/2020    Provider: Fazal Falcon MD    Last office visit: 9/18/2020    Follow up: Return in about 1 year (around 9/18/2021) for Annual visit, Labs 3-5 days prior.    Last lab related to medication: none    Upcoming appointments:   Future Appointments   Date Time Provider Department Center   12/11/2020  8:30 AM Bib Rajput MD [address]   12/11/2020  9:00 AM [facility] [address]   12/11/2020  9:30 AM Bib Rajput MD [address]   9/17/2021  1:00 PM [facility]   9/24/2021  1:00 PM Fazal Falcon MD [facility]       Refill outcome: Filled per protocol       details…

## 2021-03-01 ENCOUNTER — APPOINTMENT (OUTPATIENT)
Dept: OTOLARYNGOLOGY | Facility: CLINIC | Age: 71
End: 2021-03-01

## 2021-04-19 ENCOUNTER — APPOINTMENT (OUTPATIENT)
Dept: OTOLARYNGOLOGY | Facility: CLINIC | Age: 71
End: 2021-04-19

## 2021-08-31 ENCOUNTER — APPOINTMENT (OUTPATIENT)
Dept: ORTHOPEDIC SURGERY | Facility: CLINIC | Age: 71
End: 2021-08-31
Payer: MEDICARE

## 2021-08-31 VITALS — BODY MASS INDEX: 25.76 KG/M2 | HEIGHT: 62 IN | WEIGHT: 140 LBS

## 2021-08-31 DIAGNOSIS — M54.12 RADICULOPATHY, CERVICAL REGION: ICD-10-CM

## 2021-08-31 PROCEDURE — 99204 OFFICE O/P NEW MOD 45 MIN: CPT

## 2021-08-31 PROCEDURE — 99072 ADDL SUPL MATRL&STAF TM PHE: CPT

## 2021-08-31 RX ORDER — DICLOFENAC SODIUM 75 MG/1
75 TABLET, DELAYED RELEASE ORAL
Qty: 1 | Refills: 1 | Status: ACTIVE | COMMUNITY
Start: 2021-08-31 | End: 1900-01-01

## 2021-08-31 RX ORDER — MISOPROSTOL 200 UG/1
200 TABLET ORAL
Qty: 60 | Refills: 3 | Status: ACTIVE | COMMUNITY
Start: 2021-08-31 | End: 1900-01-01

## 2021-10-12 ENCOUNTER — APPOINTMENT (OUTPATIENT)
Dept: OTOLARYNGOLOGY | Facility: CLINIC | Age: 71
End: 2021-10-12

## 2021-12-23 ENCOUNTER — APPOINTMENT (OUTPATIENT)
Dept: OPHTHALMOLOGY | Facility: CLINIC | Age: 71
End: 2021-12-23

## 2022-02-01 ENCOUNTER — APPOINTMENT (OUTPATIENT)
Dept: OTOLARYNGOLOGY | Facility: CLINIC | Age: 72
End: 2022-02-01
Payer: MEDICARE

## 2022-02-01 VITALS
WEIGHT: 139 LBS | HEART RATE: 91 BPM | SYSTOLIC BLOOD PRESSURE: 145 MMHG | BODY MASS INDEX: 27.29 KG/M2 | HEIGHT: 60 IN | DIASTOLIC BLOOD PRESSURE: 69 MMHG

## 2022-02-01 DIAGNOSIS — D11.0 BENIGN NEOPLASM OF PAROTID GLAND: ICD-10-CM

## 2022-02-01 PROCEDURE — 99215 OFFICE O/P EST HI 40 MIN: CPT

## 2022-02-01 PROCEDURE — 99072 ADDL SUPL MATRL&STAF TM PHE: CPT

## 2022-03-02 ENCOUNTER — OUTPATIENT (OUTPATIENT)
Dept: OUTPATIENT SERVICES | Facility: HOSPITAL | Age: 72
LOS: 1 days | End: 2022-03-02
Payer: MEDICARE

## 2022-03-02 VITALS
RESPIRATION RATE: 16 BRPM | HEART RATE: 81 BPM | HEIGHT: 59 IN | SYSTOLIC BLOOD PRESSURE: 130 MMHG | TEMPERATURE: 97 F | OXYGEN SATURATION: 98 % | DIASTOLIC BLOOD PRESSURE: 70 MMHG | WEIGHT: 134.04 LBS

## 2022-03-02 DIAGNOSIS — D11.0 BENIGN NEOPLASM OF PAROTID GLAND: ICD-10-CM

## 2022-03-02 DIAGNOSIS — E11.9 TYPE 2 DIABETES MELLITUS WITHOUT COMPLICATIONS: ICD-10-CM

## 2022-03-02 DIAGNOSIS — K11.8 OTHER DISEASES OF SALIVARY GLANDS: ICD-10-CM

## 2022-03-02 DIAGNOSIS — Z98.49 CATARACT EXTRACTION STATUS, UNSPECIFIED EYE: Chronic | ICD-10-CM

## 2022-03-02 LAB
A1C WITH ESTIMATED AVERAGE GLUCOSE RESULT: 6.5 % — HIGH (ref 4–5.6)
ALBUMIN SERPL ELPH-MCNC: 4.4 G/DL — SIGNIFICANT CHANGE UP (ref 3.3–5)
ALP SERPL-CCNC: 63 U/L — SIGNIFICANT CHANGE UP (ref 40–120)
ALT FLD-CCNC: 15 U/L — SIGNIFICANT CHANGE UP (ref 4–33)
ANION GAP SERPL CALC-SCNC: 13 MMOL/L — SIGNIFICANT CHANGE UP (ref 7–14)
AST SERPL-CCNC: 15 U/L — SIGNIFICANT CHANGE UP (ref 4–32)
BILIRUB SERPL-MCNC: 0.2 MG/DL — SIGNIFICANT CHANGE UP (ref 0.2–1.2)
BUN SERPL-MCNC: 13 MG/DL — SIGNIFICANT CHANGE UP (ref 7–23)
CALCIUM SERPL-MCNC: 9.6 MG/DL — SIGNIFICANT CHANGE UP (ref 8.4–10.5)
CHLORIDE SERPL-SCNC: 97 MMOL/L — LOW (ref 98–107)
CO2 SERPL-SCNC: 23 MMOL/L — SIGNIFICANT CHANGE UP (ref 22–31)
CREAT SERPL-MCNC: 1.03 MG/DL — SIGNIFICANT CHANGE UP (ref 0.5–1.3)
EGFR: 57 ML/MIN/1.73M2 — LOW
ESTIMATED AVERAGE GLUCOSE: 140 — SIGNIFICANT CHANGE UP
GLUCOSE SERPL-MCNC: 107 MG/DL — HIGH (ref 70–99)
HCT VFR BLD CALC: 37.8 % — SIGNIFICANT CHANGE UP (ref 34.5–45)
HGB BLD-MCNC: 12.2 G/DL — SIGNIFICANT CHANGE UP (ref 11.5–15.5)
MCHC RBC-ENTMCNC: 27.3 PG — SIGNIFICANT CHANGE UP (ref 27–34)
MCHC RBC-ENTMCNC: 32.3 GM/DL — SIGNIFICANT CHANGE UP (ref 32–36)
MCV RBC AUTO: 84.6 FL — SIGNIFICANT CHANGE UP (ref 80–100)
NRBC # BLD: 0 /100 WBCS — SIGNIFICANT CHANGE UP
NRBC # FLD: 0 K/UL — SIGNIFICANT CHANGE UP
PLATELET # BLD AUTO: 481 K/UL — HIGH (ref 150–400)
POTASSIUM SERPL-MCNC: 4.9 MMOL/L — SIGNIFICANT CHANGE UP (ref 3.5–5.3)
POTASSIUM SERPL-SCNC: 4.9 MMOL/L — SIGNIFICANT CHANGE UP (ref 3.5–5.3)
PROT SERPL-MCNC: 7.8 G/DL — SIGNIFICANT CHANGE UP (ref 6–8.3)
RBC # BLD: 4.47 M/UL — SIGNIFICANT CHANGE UP (ref 3.8–5.2)
RBC # FLD: 14.9 % — HIGH (ref 10.3–14.5)
SODIUM SERPL-SCNC: 133 MMOL/L — LOW (ref 135–145)
WBC # BLD: 6.69 K/UL — SIGNIFICANT CHANGE UP (ref 3.8–10.5)
WBC # FLD AUTO: 6.69 K/UL — SIGNIFICANT CHANGE UP (ref 3.8–10.5)

## 2022-03-02 PROCEDURE — 93010 ELECTROCARDIOGRAM REPORT: CPT

## 2022-03-02 RX ORDER — ASPIRIN/CALCIUM CARB/MAGNESIUM 324 MG
1 TABLET ORAL
Qty: 0 | Refills: 0 | DISCHARGE

## 2022-03-02 RX ORDER — ALBUTEROL 90 UG/1
2 AEROSOL, METERED ORAL
Qty: 0 | Refills: 0 | DISCHARGE

## 2022-03-02 RX ORDER — ATORVASTATIN CALCIUM 80 MG/1
1 TABLET, FILM COATED ORAL
Qty: 0 | Refills: 0 | DISCHARGE

## 2022-03-02 RX ORDER — PANTOPRAZOLE SODIUM 20 MG/1
1 TABLET, DELAYED RELEASE ORAL
Qty: 0 | Refills: 0 | DISCHARGE

## 2022-03-02 RX ORDER — SODIUM CHLORIDE 9 MG/ML
1000 INJECTION, SOLUTION INTRAVENOUS
Refills: 0 | Status: DISCONTINUED | OUTPATIENT
Start: 2022-03-09 | End: 2022-03-10

## 2022-03-02 NOTE — H&P PST ADULT - NSICDXPASTMEDICALHX_GEN_ALL_CORE_FT
PAST MEDICAL HISTORY:  Asthma last nebulizer use 3-4 months ago    DM (diabetes mellitus)     HTN (hypertension)      PAST MEDICAL HISTORY:  Asthma last nebulizer use 3-4 months ago    Diabetic neuropathy     DM (diabetes mellitus)     HTN (hypertension)     Obese     Osteoporosis     Parotid mass EDITH

## 2022-03-02 NOTE — H&P PST ADULT - PROBLEM SELECTOR PLAN 1
Pt. is scheduled for a right parotidectomy 3/9/22.  Pt. verbalized understanding of instructions.  Pt. has extensive medical hx as listed above.  Therefore, pt's. already scheduled medical clearance will be requested to be faxed to PST.

## 2022-03-08 ENCOUNTER — TRANSCRIPTION ENCOUNTER (OUTPATIENT)
Age: 72
End: 2022-03-08

## 2022-03-08 NOTE — ASU PATIENT PROFILE, ADULT - NSICDXPASTMEDICALHX_GEN_ALL_CORE_FT
PAST MEDICAL HISTORY:  Asthma last nebulizer use 3-4 months ago    Diabetic neuropathy     DM (diabetes mellitus)     HTN (hypertension)     Obese     Osteoporosis     Parotid mass EDITH

## 2022-03-08 NOTE — ASU PATIENT PROFILE, ADULT - FALL HARM RISK - UNIVERSAL INTERVENTIONS
Bed in lowest position, wheels locked, appropriate side rails in place/Call bell, personal items and telephone in reach/Instruct patient to call for assistance before getting out of bed or chair/Non-slip footwear when patient is out of bed/[place] to call system/Physically safe environment - no spills, clutter or unnecessary equipment/Purposeful Proactive Rounding/Room/bathroom lighting operational, light cord in reach

## 2022-03-09 ENCOUNTER — RESULT REVIEW (OUTPATIENT)
Age: 72
End: 2022-03-09

## 2022-03-09 ENCOUNTER — APPOINTMENT (OUTPATIENT)
Dept: OTOLARYNGOLOGY | Facility: HOSPITAL | Age: 72
End: 2022-03-09

## 2022-03-09 ENCOUNTER — INPATIENT (INPATIENT)
Facility: HOSPITAL | Age: 72
LOS: 0 days | Discharge: HOME | End: 2022-03-10
Attending: OTOLARYNGOLOGY | Admitting: OTOLARYNGOLOGY
Payer: COMMERCIAL

## 2022-03-09 VITALS
TEMPERATURE: 98 F | DIASTOLIC BLOOD PRESSURE: 60 MMHG | RESPIRATION RATE: 18 BRPM | HEIGHT: 59 IN | OXYGEN SATURATION: 100 % | WEIGHT: 134.04 LBS | HEART RATE: 71 BPM | SYSTOLIC BLOOD PRESSURE: 140 MMHG

## 2022-03-09 DIAGNOSIS — Z98.49 CATARACT EXTRACTION STATUS, UNSPECIFIED EYE: Chronic | ICD-10-CM

## 2022-03-09 DIAGNOSIS — D11.0 BENIGN NEOPLASM OF PAROTID GLAND: ICD-10-CM

## 2022-03-09 LAB
GLUCOSE BLDC GLUCOMTR-MCNC: 146 MG/DL — HIGH (ref 70–99)
GLUCOSE BLDC GLUCOMTR-MCNC: 175 MG/DL — HIGH (ref 70–99)
GLUCOSE BLDC GLUCOMTR-MCNC: 91 MG/DL — SIGNIFICANT CHANGE UP (ref 70–99)

## 2022-03-09 PROCEDURE — 88307 TISSUE EXAM BY PATHOLOGIST: CPT | Mod: 26

## 2022-03-09 DEVICE — SURGIFOAM PAD 8CM X 12.5CM X 2MM (100C): Type: IMPLANTABLE DEVICE | Status: FUNCTIONAL

## 2022-03-09 DEVICE — SURGIFLO MATRIX WITH THROMBIN KIT: Type: IMPLANTABLE DEVICE | Status: FUNCTIONAL

## 2022-03-09 DEVICE — LIGATING CLIPS WECK HORIZON SMALL-WIDE (RED) 24: Type: IMPLANTABLE DEVICE | Status: FUNCTIONAL

## 2022-03-09 DEVICE — LIGATING CLIPS WECK HORIZON MEDIUM (BLUE) 24: Type: IMPLANTABLE DEVICE | Status: FUNCTIONAL

## 2022-03-09 RX ORDER — DILTIAZEM HCL 120 MG
120 CAPSULE, EXT RELEASE 24 HR ORAL
Refills: 0 | Status: DISCONTINUED | OUTPATIENT
Start: 2022-03-09 | End: 2022-03-10

## 2022-03-09 RX ORDER — INSULIN LISPRO 100/ML
VIAL (ML) SUBCUTANEOUS
Refills: 0 | Status: DISCONTINUED | OUTPATIENT
Start: 2022-03-09 | End: 2022-03-10

## 2022-03-09 RX ORDER — INSULIN LISPRO 100/ML
VIAL (ML) SUBCUTANEOUS AT BEDTIME
Refills: 0 | Status: DISCONTINUED | OUTPATIENT
Start: 2022-03-09 | End: 2022-03-10

## 2022-03-09 RX ORDER — ATORVASTATIN CALCIUM 80 MG/1
10 TABLET, FILM COATED ORAL AT BEDTIME
Refills: 0 | Status: DISCONTINUED | OUTPATIENT
Start: 2022-03-09 | End: 2022-03-10

## 2022-03-09 RX ORDER — METFORMIN HYDROCHLORIDE 850 MG/1
1 TABLET ORAL
Qty: 0 | Refills: 0 | DISCHARGE

## 2022-03-09 RX ORDER — DEXTROSE 50 % IN WATER 50 %
25 SYRINGE (ML) INTRAVENOUS ONCE
Refills: 0 | Status: DISCONTINUED | OUTPATIENT
Start: 2022-03-09 | End: 2022-03-10

## 2022-03-09 RX ORDER — PANTOPRAZOLE SODIUM 20 MG/1
1 TABLET, DELAYED RELEASE ORAL
Qty: 0 | Refills: 0 | DISCHARGE

## 2022-03-09 RX ORDER — DEXTROSE 50 % IN WATER 50 %
15 SYRINGE (ML) INTRAVENOUS ONCE
Refills: 0 | Status: DISCONTINUED | OUTPATIENT
Start: 2022-03-09 | End: 2022-03-10

## 2022-03-09 RX ORDER — HYDROMORPHONE HYDROCHLORIDE 2 MG/ML
0.5 INJECTION INTRAMUSCULAR; INTRAVENOUS; SUBCUTANEOUS
Refills: 0 | Status: DISCONTINUED | OUTPATIENT
Start: 2022-03-09 | End: 2022-03-09

## 2022-03-09 RX ORDER — ALENDRONATE SODIUM 70 MG/1
1 TABLET ORAL
Qty: 0 | Refills: 0 | DISCHARGE

## 2022-03-09 RX ORDER — BUDESONIDE AND FORMOTEROL FUMARATE DIHYDRATE 160; 4.5 UG/1; UG/1
2 AEROSOL RESPIRATORY (INHALATION)
Refills: 0 | Status: DISCONTINUED | OUTPATIENT
Start: 2022-03-09 | End: 2022-03-10

## 2022-03-09 RX ORDER — DEXTROSE 50 % IN WATER 50 %
12.5 SYRINGE (ML) INTRAVENOUS ONCE
Refills: 0 | Status: DISCONTINUED | OUTPATIENT
Start: 2022-03-09 | End: 2022-03-10

## 2022-03-09 RX ORDER — PANTOPRAZOLE SODIUM 20 MG/1
40 TABLET, DELAYED RELEASE ORAL
Refills: 0 | Status: DISCONTINUED | OUTPATIENT
Start: 2022-03-09 | End: 2022-03-10

## 2022-03-09 RX ORDER — ASPIRIN/CALCIUM CARB/MAGNESIUM 324 MG
1 TABLET ORAL
Qty: 0 | Refills: 0 | DISCHARGE

## 2022-03-09 RX ORDER — ATORVASTATIN CALCIUM 80 MG/1
1 TABLET, FILM COATED ORAL
Qty: 0 | Refills: 0 | DISCHARGE

## 2022-03-09 RX ORDER — ACETAMINOPHEN 500 MG
1000 TABLET ORAL ONCE
Refills: 0 | Status: DISCONTINUED | OUTPATIENT
Start: 2022-03-09 | End: 2022-03-09

## 2022-03-09 RX ORDER — GABAPENTIN 400 MG/1
200 CAPSULE ORAL AT BEDTIME
Refills: 0 | Status: DISCONTINUED | OUTPATIENT
Start: 2022-03-09 | End: 2022-03-10

## 2022-03-09 RX ORDER — GABAPENTIN 400 MG/1
100 CAPSULE ORAL DAILY
Refills: 0 | Status: DISCONTINUED | OUTPATIENT
Start: 2022-03-09 | End: 2022-03-10

## 2022-03-09 RX ORDER — GABAPENTIN 400 MG/1
1 CAPSULE ORAL
Qty: 0 | Refills: 0 | DISCHARGE

## 2022-03-09 RX ORDER — GLUCAGON INJECTION, SOLUTION 0.5 MG/.1ML
1 INJECTION, SOLUTION SUBCUTANEOUS ONCE
Refills: 0 | Status: DISCONTINUED | OUTPATIENT
Start: 2022-03-09 | End: 2022-03-10

## 2022-03-09 RX ORDER — MONTELUKAST 4 MG/1
1 TABLET, CHEWABLE ORAL
Qty: 0 | Refills: 0 | DISCHARGE

## 2022-03-09 RX ORDER — SODIUM CHLORIDE 9 MG/ML
1000 INJECTION, SOLUTION INTRAVENOUS
Refills: 0 | Status: DISCONTINUED | OUTPATIENT
Start: 2022-03-09 | End: 2022-03-10

## 2022-03-09 RX ORDER — BUDESONIDE AND FORMOTEROL FUMARATE DIHYDRATE 160; 4.5 UG/1; UG/1
2 AEROSOL RESPIRATORY (INHALATION)
Qty: 0 | Refills: 0 | DISCHARGE

## 2022-03-09 RX ORDER — MONTELUKAST 4 MG/1
10 TABLET, CHEWABLE ORAL AT BEDTIME
Refills: 0 | Status: DISCONTINUED | OUTPATIENT
Start: 2022-03-09 | End: 2022-03-10

## 2022-03-09 RX ORDER — DILTIAZEM HCL 120 MG
1 CAPSULE, EXT RELEASE 24 HR ORAL
Qty: 0 | Refills: 0 | DISCHARGE

## 2022-03-09 RX ORDER — OXYCODONE HYDROCHLORIDE 5 MG/1
5 TABLET ORAL EVERY 6 HOURS
Refills: 0 | Status: DISCONTINUED | OUTPATIENT
Start: 2022-03-09 | End: 2022-03-10

## 2022-03-09 RX ORDER — GABAPENTIN 400 MG/1
2 CAPSULE ORAL
Qty: 0 | Refills: 0 | DISCHARGE

## 2022-03-09 RX ORDER — OXYCODONE HYDROCHLORIDE 5 MG/1
10 TABLET ORAL EVERY 6 HOURS
Refills: 0 | Status: DISCONTINUED | OUTPATIENT
Start: 2022-03-09 | End: 2022-03-10

## 2022-03-09 RX ORDER — ACETAMINOPHEN 500 MG
650 TABLET ORAL EVERY 6 HOURS
Refills: 0 | Status: DISCONTINUED | OUTPATIENT
Start: 2022-03-09 | End: 2022-03-10

## 2022-03-09 RX ORDER — ONDANSETRON 8 MG/1
4 TABLET, FILM COATED ORAL ONCE
Refills: 0 | Status: DISCONTINUED | OUTPATIENT
Start: 2022-03-09 | End: 2022-03-09

## 2022-03-09 RX ADMIN — ATORVASTATIN CALCIUM 10 MILLIGRAM(S): 80 TABLET, FILM COATED ORAL at 21:44

## 2022-03-09 RX ADMIN — HYDROMORPHONE HYDROCHLORIDE 0.5 MILLIGRAM(S): 2 INJECTION INTRAMUSCULAR; INTRAVENOUS; SUBCUTANEOUS at 19:45

## 2022-03-09 RX ADMIN — Medication 5 MILLIGRAM(S): at 21:45

## 2022-03-09 RX ADMIN — BUDESONIDE AND FORMOTEROL FUMARATE DIHYDRATE 2 PUFF(S): 160; 4.5 AEROSOL RESPIRATORY (INHALATION) at 23:17

## 2022-03-09 RX ADMIN — OXYCODONE HYDROCHLORIDE 10 MILLIGRAM(S): 5 TABLET ORAL at 23:17

## 2022-03-09 RX ADMIN — OXYCODONE HYDROCHLORIDE 10 MILLIGRAM(S): 5 TABLET ORAL at 22:47

## 2022-03-09 RX ADMIN — HYDROMORPHONE HYDROCHLORIDE 0.5 MILLIGRAM(S): 2 INJECTION INTRAMUSCULAR; INTRAVENOUS; SUBCUTANEOUS at 19:30

## 2022-03-09 RX ADMIN — GABAPENTIN 200 MILLIGRAM(S): 400 CAPSULE ORAL at 22:44

## 2022-03-09 RX ADMIN — HYDROMORPHONE HYDROCHLORIDE 0.5 MILLIGRAM(S): 2 INJECTION INTRAMUSCULAR; INTRAVENOUS; SUBCUTANEOUS at 20:30

## 2022-03-09 RX ADMIN — HYDROMORPHONE HYDROCHLORIDE 0.5 MILLIGRAM(S): 2 INJECTION INTRAMUSCULAR; INTRAVENOUS; SUBCUTANEOUS at 20:00

## 2022-03-09 RX ADMIN — HYDROMORPHONE HYDROCHLORIDE 0.5 MILLIGRAM(S): 2 INJECTION INTRAMUSCULAR; INTRAVENOUS; SUBCUTANEOUS at 20:15

## 2022-03-09 RX ADMIN — MONTELUKAST 10 MILLIGRAM(S): 4 TABLET, CHEWABLE ORAL at 21:44

## 2022-03-10 ENCOUNTER — TRANSCRIPTION ENCOUNTER (OUTPATIENT)
Age: 72
End: 2022-03-10

## 2022-03-10 VITALS
TEMPERATURE: 98 F | DIASTOLIC BLOOD PRESSURE: 60 MMHG | OXYGEN SATURATION: 95 % | HEART RATE: 76 BPM | RESPIRATION RATE: 17 BRPM | SYSTOLIC BLOOD PRESSURE: 108 MMHG

## 2022-03-10 PROBLEM — E66.9 OBESITY, UNSPECIFIED: Chronic | Status: ACTIVE | Noted: 2022-03-02

## 2022-03-10 PROBLEM — J45.909 UNSPECIFIED ASTHMA, UNCOMPLICATED: Chronic | Status: ACTIVE | Noted: 2019-03-04

## 2022-03-10 PROBLEM — K11.8 OTHER DISEASES OF SALIVARY GLANDS: Chronic | Status: ACTIVE | Noted: 2022-03-02

## 2022-03-10 PROBLEM — E11.40 TYPE 2 DIABETES MELLITUS WITH DIABETIC NEUROPATHY, UNSPECIFIED: Chronic | Status: ACTIVE | Noted: 2022-03-02

## 2022-03-10 PROBLEM — M81.0 AGE-RELATED OSTEOPOROSIS WITHOUT CURRENT PATHOLOGICAL FRACTURE: Chronic | Status: ACTIVE | Noted: 2022-03-02

## 2022-03-10 LAB — GLUCOSE BLDC GLUCOMTR-MCNC: 140 MG/DL — HIGH (ref 70–99)

## 2022-03-10 PROCEDURE — 99239 HOSP IP/OBS DSCHRG MGMT >30: CPT

## 2022-03-10 RX ORDER — OXYCODONE HYDROCHLORIDE 5 MG/1
1 TABLET ORAL
Qty: 12 | Refills: 0
Start: 2022-03-10 | End: 2022-03-12

## 2022-03-10 RX ORDER — ACETAMINOPHEN 500 MG
2 TABLET ORAL
Qty: 24 | Refills: 0
Start: 2022-03-10 | End: 2022-03-12

## 2022-03-10 RX ADMIN — Medication 120 MILLIGRAM(S): at 06:49

## 2022-03-10 RX ADMIN — BUDESONIDE AND FORMOTEROL FUMARATE DIHYDRATE 2 PUFF(S): 160; 4.5 AEROSOL RESPIRATORY (INHALATION) at 09:50

## 2022-03-10 NOTE — DISCHARGE NOTE PROVIDER - NSDCFUADDINST_GEN_ALL_CORE_FT
1. Please empty your SARY drain as taught at [facility]  2. Please record the amount of SARY drainage into a piece of paper  3. Please bring the SARY drain log to your appointment  Wound Care: Keep the incision site clean and dry, do not get wet for at least 48 hours (2 days).   Pain Control: Alternate Tylenol 650mg and ibuprofen 800mg every 6 hours for pain. Do not take more than 4000mg of either medication in 24 hour period. Oxycodone every 6 hours as needed for severe pain. Take stool softener while taking oxycodone as can cause constipation  Activity: No heavy lifting or strenuous exercise for 2-4 weeks.   Follow up with Dr. Elizalde as scheduled

## 2022-03-10 NOTE — DISCHARGE NOTE PROVIDER - CARE PROVIDER_API CALL
Kelby Elizalde)  Otolaryngology  [address] [place] [address]  Phone: [phone]  Fax: [phone]  Follow Up Time:

## 2022-03-10 NOTE — DISCHARGE NOTE NURSING/CASE MANAGEMENT/SOCIAL WORK - NSDCPEFALRISK_GEN_ALL_CORE
For information on Fall & Injury Prevention, visit: [email or web address] OR  [email or web address] OR  https://www.cdc.gov/steadi/patient.html

## 2022-03-10 NOTE — DISCHARGE NOTE PROVIDER - NSDCMRMEDTOKEN_GEN_ALL_CORE_FT
alendronate 70 mg oral tablet: 1 tab(s) orally once a week on Wednesdays  aspirin 81 mg oral tablet: 1 tab(s) orally once a day (in the evening)  atorvastatin 10 mg oral tablet: 1 tab(s) orally once a day (in the evening)  budesonide-formoterol 160 mcg-4.5 mcg/inh inhalation aerosol: 2 puff(s) inhaled 2 times a day  dilTIAZem 120 mg oral tablet: 1 tab(s) orally 3 times a day  pt. states takes BID  enalapril 5 mg oral tablet: 1 tab(s) orally once a day (in the evening)  gabapentin 100 mg oral capsule: 1 cap(s) orally once a day-morning  gabapentin 100 mg oral capsule: 2 cap(s) orally once a day (at bedtime)  metFORMIN 500 mg oral tablet, extended release: 1 tab(s) orally once a day (at bedtime)  montelukast 10 mg oral tablet: 1 tab(s) orally once a day (at bedtime)  pantoprazole 40 mg oral delayed release tablet: 1 tab(s) orally prn, As Needed  as per pt.

## 2022-03-10 NOTE — DISCHARGE NOTE PROVIDER - NSDCCPCAREPLAN_GEN_ALL_CORE_FT
PRINCIPAL DISCHARGE DIAGNOSIS  Diagnosis: Benign neoplasm parotid gland  Assessment and Plan of Treatment: - Please follow up with Dr. Elizalde as scheduled

## 2022-03-10 NOTE — DISCHARGE NOTE NURSING/CASE MANAGEMENT/SOCIAL WORK - PATIENT PORTAL LINK FT
You can access the FollowMyHealth Patient Portal offered by [facility] by registering at the following website: [email or web address]. By joining [facility]’s FollowMyHealth portal, you will also be able to view your health information using other applications (apps) compatible with our system.

## 2022-03-10 NOTE — DISCHARGE NOTE PROVIDER - HOSPITAL COURSE
71 year old female with [place] tumor of right parotid gland S/P right parotidectomy on 3/9/2022. Had SARY drains that were monitored for output to prevent seroma formation. SARY drains teaching done prior to discharge. Tolerating regular PO diet, pain is controlled. Patient was cleared for discharge home By Dr. Elizalde on 3/10/2022. All prescriptions were sent to a pharmacy that was agreed on with the patient.

## 2022-03-10 NOTE — PROGRESS NOTE ADULT - SUBJECTIVE AND OBJECTIVE BOX
ENT POST-OP CHECK    HPI: 71F w/ Alayna s/p R parotid 3/9    No acute periop events. Patient was extubated and transferred to room with no issues. Pt is awake and interactive. Pain well controlled. Breathing comfortably.     ICU Vital Signs Last 24 Hrs  T(C): 36.4 (09 Mar 2022 22:10), Max: 36.7 (09 Mar 2022 13:40)  T(F): 97.6 (09 Mar 2022 22:10), Max: 98.1 (09 Mar 2022 13:40)  HR: 89 (09 Mar 2022 22:10) (71 - 98)  BP: 144/78 (09 Mar 2022 22:10) (119/74 - 148/74)  BP(mean): 93 (09 Mar 2022 21:15) (83 - 97)  ABP: --  ABP(mean): --  RR: 18 (09 Mar 2022 22:10) (14 - 20)  SpO2: 98% (09 Mar 2022 22:10) (91% - 100%)    PHYSICAL EXAM:    CONSTITUTIONAL: Well nourished, well developed, NON-DYSMORPHIC, and in no acute distress.    HEAD: normocephalic, atraumatic. Claudia weakness  NECK: Neck incision c/d/i. SARY 1x ss.  RESPIRATORY: Respirations unlabored, no increased work of breathing with use of accessory muscles and retractions. No stridor.  CARDIAC: Warm extremities, no cyanosis.       A/P:  71F w/ Alayna s/p R parotid 3/9    -  care  - C/w home meds  - Regular diet  - Pain control
PROGRESS NOTE    70yo female s/p R parotid 3/9     Interval hx  3/10: DYLLAN SARY 10     Vital Signs Last 24 Hrs  T(C): 36.9 (10 Mar 2022 06:30), Max: 36.9 (10 Mar 2022 06:30)  T(F): 98.4 (10 Mar 2022 06:30), Max: 98.4 (10 Mar 2022 06:30)  HR: 89 (10 Mar 2022 06:30) (71 - 98)  BP: 125/69 (10 Mar 2022 06:30) (119/74 - 148/74)  BP(mean): 93 (09 Mar 2022 21:15) (83 - 97)  RR: 17 (10 Mar 2022 06:30) (14 - 20)  SpO2: 97% (10 Mar 2022 06:30) (91% - 100%)    NAD  Neck soft flat  incision c/d/i   SARY w SS drainage     A/P  - dc SARY  - dispo home

## 2022-03-11 ENCOUNTER — APPOINTMENT (OUTPATIENT)
Dept: OTOLARYNGOLOGY | Facility: CLINIC | Age: 72
End: 2022-03-11
Payer: MEDICARE

## 2022-03-11 PROCEDURE — 99024 POSTOP FOLLOW-UP VISIT: CPT

## 2022-03-11 NOTE — HISTORY OF PRESENT ILLNESS
[de-identified] : 71 yro female pt here for drain removal s/p right parotidectomy on 3/9/2022. Pt c/o feeling tired. Drain has 35cc since discharge from hospital. \par Denies dysphagia, dyspnea, dysphonia, fever, or chills. \par

## 2022-03-11 NOTE — PHYSICAL EXAM
[de-identified] : no steri strips noted on right neck incision. Slight tenderness and swelling, no discharge noted

## 2022-03-16 ENCOUNTER — APPOINTMENT (OUTPATIENT)
Dept: OTOLARYNGOLOGY | Facility: CLINIC | Age: 72
End: 2022-03-16
Payer: MEDICARE

## 2022-03-16 DIAGNOSIS — R22.0 LOCALIZED SWELLING, MASS AND LUMP, HEAD: ICD-10-CM

## 2022-03-16 PROCEDURE — 99024 POSTOP FOLLOW-UP VISIT: CPT

## 2022-03-16 NOTE — PHYSICAL EXAM
[de-identified] : Wound clean and intact without any swelling, tenderness, erythema or discharge.
normal...

## 2022-03-16 NOTE — HISTORY OF PRESENT ILLNESS
[de-identified] : Pt is here today post right parotidectomy on 3/9/2022 for parotid mass. pt is doing well, and mild right lip weakness. pt has no neck/face redness, no neck/face mass, no difficulty swallowing, no neck pain, no painful swallowing and no chills. final path pending. \par

## 2022-03-18 LAB — SURGICAL PATHOLOGY STUDY: SIGNIFICANT CHANGE UP

## 2022-03-25 RX ORDER — GABAPENTIN 100 MG/1
100 CAPSULE ORAL
Qty: 90 | Refills: 0 | Status: ACTIVE | COMMUNITY
Start: 2021-08-31 | End: 1900-01-01

## 2022-03-30 NOTE — REASON FOR VISIT
[Subsequent Evaluation] : a subsequent evaluation for [FreeTextEntry2] : R parotid warthin's tumor.

## 2022-03-30 NOTE — PHYSICAL EXAM
[Midline] : trachea located in midline position [Normal] : no rashes [de-identified] : R. tail of parotid mass, approx. 4 cm, slight TTP, no other lesions, no LAD.  There is a smaller, approx. 1-1.5 mass in the left parotid, mobile, no TTP.

## 2022-03-30 NOTE — HISTORY OF PRESENT ILLNESS
[de-identified] : 71 year old female presents for follow up for R parotid warthin's tumor.  States still having neck swelling at times and right side is tender to touch. Patient denies dysphagia, odynophagia, dyspnea, dysphonia, night sweats, chills, fevers, otalgia, or sudden weight loss.

## 2022-03-30 NOTE — DATA REVIEWED
[de-identified] : US with bilateral parotid lesions. The larger lesion on the right measures 4.72 x 2.43 x 5.12 cm. The lesion on the left measures 1.58 x 1.21 cm.

## 2022-06-28 ENCOUNTER — APPOINTMENT (OUTPATIENT)
Dept: OTOLARYNGOLOGY | Facility: CLINIC | Age: 72
End: 2022-06-28

## 2022-06-28 VITALS
SYSTOLIC BLOOD PRESSURE: 149 MMHG | BODY MASS INDEX: 27.29 KG/M2 | WEIGHT: 139 LBS | DIASTOLIC BLOOD PRESSURE: 72 MMHG | HEIGHT: 60 IN | HEART RATE: 97 BPM

## 2022-06-28 PROCEDURE — 76536 US EXAM OF HEAD AND NECK: CPT

## 2022-06-28 PROCEDURE — 99213 OFFICE O/P EST LOW 20 MIN: CPT | Mod: 25

## 2022-06-28 PROCEDURE — 99072 ADDL SUPL MATRL&STAF TM PHE: CPT

## 2022-06-28 RX ORDER — ATORVASTATIN CALCIUM 10 MG/1
10 TABLET, FILM COATED ORAL
Qty: 90 | Refills: 0 | Status: ACTIVE | COMMUNITY
Start: 2021-06-12

## 2022-06-28 RX ORDER — ALENDRONATE SODIUM 70 MG/1
70 TABLET ORAL
Qty: 12 | Refills: 0 | Status: ACTIVE | COMMUNITY
Start: 2022-04-26

## 2022-06-28 RX ORDER — DONEPEZIL HYDROCHLORIDE 5 MG/1
5 TABLET ORAL
Qty: 90 | Refills: 0 | Status: ACTIVE | COMMUNITY
Start: 2022-04-26

## 2022-06-28 RX ORDER — BLOOD-GLUCOSE METER
W/DEVICE KIT MISCELLANEOUS
Qty: 1 | Refills: 0 | Status: ACTIVE | COMMUNITY
Start: 2022-05-17

## 2022-06-28 RX ORDER — DILTIAZEM HYDROCHLORIDE 120 MG/1
120 TABLET ORAL
Qty: 180 | Refills: 0 | Status: ACTIVE | COMMUNITY
Start: 2021-06-08

## 2022-06-28 RX ORDER — OXYCODONE 5 MG/1
5 TABLET ORAL
Qty: 12 | Refills: 0 | Status: ACTIVE | COMMUNITY
Start: 2022-03-10

## 2022-06-28 RX ORDER — DICLOFENAC SODIUM 1% 10 MG/G
1 GEL TOPICAL
Qty: 100 | Refills: 0 | Status: ACTIVE | COMMUNITY
Start: 2021-12-03

## 2022-06-28 RX ORDER — AZITHROMYCIN 250 MG/1
250 TABLET, FILM COATED ORAL
Qty: 6 | Refills: 0 | Status: DISCONTINUED | COMMUNITY
Start: 2022-05-19

## 2022-06-28 RX ORDER — LANCETS 28 GAUGE
EACH MISCELLANEOUS
Qty: 100 | Refills: 0 | Status: ACTIVE | COMMUNITY
Start: 2022-05-17

## 2022-06-28 RX ORDER — BLOOD SUGAR DIAGNOSTIC
STRIP MISCELLANEOUS
Qty: 100 | Refills: 0 | Status: ACTIVE | COMMUNITY
Start: 2021-09-30

## 2022-06-28 RX ORDER — LEVOCETIRIZINE DIHYDROCHLORIDE 5 MG/1
5 TABLET ORAL
Qty: 30 | Refills: 0 | Status: ACTIVE | COMMUNITY
Start: 2022-03-23

## 2022-06-28 NOTE — HISTORY OF PRESENT ILLNESS
[de-identified] : 71 year old female presents for follow up s/p Right parotidectomy and reconstruction with sternocleidomastoid muscle flap 3/9/22.  States still having pain at the surgical site sometimes and the area is still numb.  Feels a tightness in the right neck.  Denies bleeding, drainage, swelling, fevers.   States has had a little pain on the left side for many years, no changes.

## 2022-06-28 NOTE — PHYSICAL EXAM
[Midline] : trachea located in midline position [Normal] : no rashes [de-identified] : R. incision healing well, no collections, no palpable masses, no LAD. L. parotid mass, approx. 1.5 cm, mobile, stable.  No TTP.

## 2022-06-28 NOTE — REASON FOR VISIT
[Subsequent Evaluation] : a subsequent evaluation for [FreeTextEntry2] : follow up s/p Right parotidectomy and reconstruction with sternocleidomastoid muscle flap 3/9/22

## 2022-06-28 NOTE — DATA REVIEWED
[de-identified] : US with L. parotid mass measuring 1.61 x 0.78 x 1.21 cm.  On the right, there are smaller parotid lesions/nodes superiorly deep parotid with largest one measuring 1.21 x 0.63 x 1.01 cm.

## 2023-01-03 ENCOUNTER — APPOINTMENT (OUTPATIENT)
Dept: OTOLARYNGOLOGY | Facility: CLINIC | Age: 73
End: 2023-01-03

## 2023-01-18 NOTE — PHYSICAL THERAPY INITIAL EVALUATION ADULT - PATIENT/FAMILY/SIGNIFICANT OTHER GOALS STATEMENT, PT EVAL
RENAL PROGRESS NOTE    CC:  BETITO, CHF, NSTEMI    ASSESSMENT & PLAN:   70 year old male with known CAD who presented to ER with CP, s/p CABGx2 01/16. Nephrology is following for BETITO.    Non-oliguric BETITO- previously appears to have normal renal function; but more variable in the last few months, Cr up to 1.7 during preop last week.  Suspect hemodynamic with diuretic/ACE therapy.  BP remains on the lower side here.  Not clear to me that this is truly cardiorenal at this point in time.  Now received contrast on admission which will potentially muddy the picture further.  Holding ACEi.  Cr better today.  Likely related to resolving SINDI but may be cardiorenal at play as well.  Creatinine was trending  up to 1.7 mg/dl 01/17. Today serum creatinine is trending down to 1.5 mg/dl.    UOP in non oliguric range w/o diuretics.  Recs:  - hold ACEi  - can continue to hold bumex  - daily labs  -Monitor UOP    Electrolytes/Acid base status-no pressing issues.    Metabolic acidosis-mild. HCO3 is 20 this am. No indications for supplementation. Trend.     Acute Systolic CHF; Echo with mildly reduced LVEF, severe pulmonary hypertension.   Imaging suggestive of volume excess but no peripheral edema.  His orthostatic symptoms rior to surgery may have been related to lower BP rather than volume deficit.    Currently looks euvolemic and diuretic on hold.  Extubated to 4L via NC last night.   Weight is up but checked in bed. Net I/O is 2.5 L negative.  Daily weight  Strict I/O.    CAD/unstable angina with non STEMI -s/p CABGx2 01/16/2023. Management as per CST and cardiology.    HTN; on diuretics and coreg and ACEi prior to admit.  BP is in normotensive rage this am. Weaned off pressors this am.   Hold diuretics.     DM-on insulin; hold metformin.  Per [facility]    Hyperlipid; on statin    Hypothyroid- on replacement    We will follow.    SUBJECTIVE:    The patient was seen at the bedside and events were reveiwed with nursing.   The patient  was extubated overnight to 4L supp O2 via NC. IABP removed. Received few albumin boluses. Weaned off pressors this am.   The patient is sitting up in a chair this am. Reports 1/10 pain at the incision site in the sternum.  Patient denies: dizziness, cough, shortness of breath , chest pain, palpitations, orthopnea, nausea, vomiting, abdominal pain.  Updated on labs. All questions answered.      OBJECTIVE:  Physical Exam   Temp: 99  F (37.2  C) Temp src: Pulmonary Artery BP: 94/58 Pulse: 99   Resp: 18 SpO2: 100 % O2 Device: Mechanical Ventilator Oxygen Delivery: 4 LPM  Vitals:    01/16/23 0413 01/17/23 0300 01/18/23 0645   Weight: 93.1 kg (205 lb 4.8 oz) 98.1 kg (216 lb 4.8 oz) 99.2 kg (218 lb 11.2 oz)     Vital Signs with Ranges  Temp:  [99  F (37.2  C)-100.2  F (37.9  C)] 99  F (37.2  C)  Pulse:  [] 99  Resp:  [15-18] 18  BP: (94)/(58) 94/58  MAP:  [54 mmHg-80 mmHg] 73 mmHg  Arterial Line BP: ()/(41-60) 111/55  FiO2 (%):  [30 %-40 %] 30 %  SpO2:  [94 %-100 %] 100 %  I/O last 3 completed shifts:  In: 3208 [I.V.:2412]  Out: 1470 [Urine:1360; Chest Tube:110]    @TMAXR(24)@    Patient Vitals for the past 72 hrs:   Weight   01/18/23 0645 99.2 kg (218 lb 11.2 oz)   01/17/23 0300 98.1 kg (216 lb 4.8 oz)   01/16/23 0413 93.1 kg (205 lb 4.8 oz)       Intake/Output Summary (Last 24 hours) at 1/9/2023 0849  Last data filed at 1/9/2023 0604  Gross per 24 hour   Intake 1055 ml   Output 950 ml   Net 105 ml       PHYSICAL EXAM:  Gen: NAD  HEENT: NC in place.  CV: RRR  Lung: CTA b/l, CT in place  Ab: soft and NT; not distended; normal bs  : bhakta catheter in place, making yellow color urine.  Ext: no edema  Neuro: AAOx4, moves all extremities.    LABORATORY STUDIES:     Recent Labs   Lab 01/18/23  0508 01/17/23  0408 01/16/23  1245 01/16/23  1122 01/16/23  1121 01/16/23  1029 01/16/23  0755 01/14/23  0424   WBC  --  15.0* 27.6* 26.9*  --   --   --  10.0   RBC  --  3.36* 3.61* 3.05*  --   --   --  4.03*   HGB 8.5*  10.5* 11.2* 9.7* 10.0* 9.3*   < > 12.7*   HCT  --  31.4* 33.3* 28.4*  --   --   --  37.4*   PLT  --  315 397 331  --   --   --  480*    < > = values in this interval not displayed.       Basic Metabolic Panel:  Recent Labs   Lab 01/18/23  0825 01/18/23  0701 01/18/23  0508 01/18/23  0507 01/18/23  0323 01/18/23  0113 01/17/23  2337 01/17/23  2331 01/17/23  0409 01/17/23  0408 01/16/23  1344 01/16/23  1245 01/16/23  1239 01/16/23  1121 01/16/23  1029 01/16/23  0947 01/16/23  0755 01/15/23  0751 01/15/23  0440 01/14/23  0758 01/14/23  0424 01/13/23  0813 01/13/23  0533 01/12/23  0834 01/12/23  0542   NA  --   --   --   --   --   --   --   --   --  140  --  137  --  139 138 138 137  --  135*  --  136  --  135*  --  138   POTASSIUM  --   --  4.1  --   --   --  4.1  --   --  4.7  --  3.8  --  4.1 4.5 4.2 4.4   < > 3.8  --  3.6   < > 3.3*   < > 3.1*   CHLORIDE  --   --   --   --   --   --   --   --   --  107  --  100  --   --   --   --   --   --  93*  --  92*  --  92*  --  92*   CO2  --   --   --   --   --   --   --   --   --  21*  --  22  --   --   --   --   --   --  25  --  29  --  27  --  27   BUN  --   --   --   --   --   --   --   --   --  47.3*  --  54.2*  --   --   --   --   --   --  65.3*  --  62.8*  --  58.6*  --  47.2*   CR  --   --   --   --   --   --   --   --   --  1.74*  --  1.60*  --   --   --   --   --   --  1.70*  --  1.86*  --  1.80*  --  1.80*   GLC 91 100*  --  116* 137* 116*  --  125*   < > 149*   < > 160*   < > 153* 147* 168* 244*   < > 168*   < > 115*   < > 88   < > 132*   VIDA  --   --   --   --   --   --   --   --   --  9.6  --  10.3*  --   --   --   --   --   --  10.1  --  10.5*  --  10.5*  --  10.2    < > = values in this interval not displayed.       INR  Recent Labs   Lab 01/16/23  1245 01/16/23  1122   INR 1.28* 1.48*        Recent Labs   Lab Test 01/18/23  0508 01/17/23  0408 01/16/23  1245 01/16/23  1122   INR  --   --  1.28* 1.48*   WBC  --  15.0* 27.6* 26.9*   HGB 8.5* 10.5* 11.2* 9.7*    PLT  --  315 677 331       Personally reviewed current labs      Opal Pradhan MD  [facility]  [address] [place] [address], [address]  [place] [address]  Phone# [phone]  Fax# [phone]         To feel better

## 2023-05-02 ENCOUNTER — APPOINTMENT (OUTPATIENT)
Dept: OTOLARYNGOLOGY | Facility: CLINIC | Age: 73
End: 2023-05-02
Payer: MEDICARE

## 2023-05-02 VITALS — DIASTOLIC BLOOD PRESSURE: 78 MMHG | SYSTOLIC BLOOD PRESSURE: 145 MMHG | HEART RATE: 89 BPM

## 2023-05-02 PROCEDURE — 99214 OFFICE O/P EST MOD 30 MIN: CPT | Mod: 25

## 2023-05-02 PROCEDURE — 99072 ADDL SUPL MATRL&STAF TM PHE: CPT

## 2023-05-02 PROCEDURE — 76536 US EXAM OF HEAD AND NECK: CPT

## 2023-05-02 NOTE — HISTORY OF PRESENT ILLNESS
[de-identified] : 72 year old female presents for follow up s/p Right parotidectomy and reconstruction with sternocleidomastoid muscle flap 3/9/22. \par \par Pt is here for f/u \par States still having pain at the surgical site sometimes and the area is still numb.  Feels a tightness in the right neck.  Denies bleeding, drainage, swelling, fevers., dysphagia, breathing well, denies globus sensation and recent throat infections\par Weight stable.

## 2023-05-02 NOTE — PHYSICAL EXAM
[Midline] : trachea located in midline position [Normal] : no rashes [de-identified] : R. incision healing well, no collections, no palpable masses, no LAD. L. parotid mass, approx. 1.5 cm, mobile, stable.  No TTP.

## 2023-05-02 NOTE — DATA REVIEWED
[de-identified] : US with L. parotid mass measuring 2.51 x 1.63 x 1.18 cm - larger.  There are multiple other smaller lesions. On the right, there are smaller parotid lesions/nodes superiorly deep parotid with largest one measuring 1.24 x 0.68 x 1.12 cm. \par

## 2023-05-16 NOTE — INPATIENT CERTIFICATION FOR MEDICARE PATIENTS - IN ORDER TO MEET MEDICARE REQUIREMENTS.
In order to meet Medicare requirements, the clinical documentation must support the information cited in the admission order.  Please be sure to provide detailed and clear documentation about the following in the admitting note/history and physical: Initial Size Of Lesion: 0.8

## 2023-07-18 NOTE — ASU PATIENT PROFILE, ADULT - MENTAL HEALTH CONDITIONS/SYMPTOMS, PROFILE
[facility]  [address] [place] [address]  [phone]      Rj Crockett is a 78 WellSpan Surgery & Rehabilitation Hospital OF Community Hospital East)  Getting MRI brain every 10 weeks now  Last MRI showed changes  No upcoming Neuro visits scheduled at [place] [place]    Will see Dr. Lynne Quan next month    Saw Ortho at [facility] - none

## 2023-08-11 ENCOUNTER — APPOINTMENT (OUTPATIENT)
Dept: OTOLARYNGOLOGY | Facility: CLINIC | Age: 73
End: 2023-08-11
Payer: MEDICARE

## 2023-08-11 VITALS
BODY MASS INDEX: 27.48 KG/M2 | HEART RATE: 93 BPM | SYSTOLIC BLOOD PRESSURE: 131 MMHG | OXYGEN SATURATION: 95 % | DIASTOLIC BLOOD PRESSURE: 78 MMHG | HEIGHT: 60 IN | WEIGHT: 140 LBS

## 2023-08-11 DIAGNOSIS — D11.9 BENIGN NEOPLASM OF MAJOR SALIVARY GLAND, UNSPECIFIED: ICD-10-CM

## 2023-08-11 PROCEDURE — 99213 OFFICE O/P EST LOW 20 MIN: CPT

## 2023-08-11 PROCEDURE — 76536 US EXAM OF HEAD AND NECK: CPT

## 2023-08-11 NOTE — PHYSICAL EXAM
[Midline] : trachea located in midline position [Normal] : no rashes [de-identified] : R. incision healing well, no collections, no palpable masses, no LAD. L. parotid mass, approx. 1.5 cm, mobile, stable.  No TTP.

## 2023-08-11 NOTE — HISTORY OF PRESENT ILLNESS
[de-identified] : 73 year old female presents for follow up of Warthin's Tumor. s/p Right parotidectomy and reconstruction with sternocleidomastoid muscle flap 3/9/22. States still having pain at the surgical site sometimes and the area is still numb.  Reports she feels a tightness in the right neck (pulling sensation). Reports intermittent left parotid swelling and some discomfort. Patient denies globus sensation, dysphagia, odynophagia, dyspnea, dysphonia, hemoptysis or otalgia. Denies recent fevers/infections, chills, night sweats, weight loss.

## 2023-08-11 NOTE — DATA REVIEWED
[de-identified] : US with L. parotid mass measuring 1.95 x 1.45 x 1.21 cm - smaller.  There are multiple other smaller lesions. On the right, there are smaller parotid lesions/nodes superiorly deep parotid with largest one measuring 1.21 x 0.58 x 1.15 cm - stable.

## 2023-08-11 NOTE — REASON FOR VISIT
[Subsequent Evaluation] : a subsequent evaluation for [Other: _____] : [unfilled] [Patient Declined  Services] : - None: Patient declined  services [Interpreters_Relationshiptopatient] : Son [FreeTextEntry3] : Patient declined offer of translation service. Patient preferred to use accompanying family member/friend for translation.  [TWNoteComboBox1] : Lisa

## 2024-03-04 ENCOUNTER — NON-APPOINTMENT (OUTPATIENT)
Age: 74
End: 2024-03-04

## 2024-03-05 ENCOUNTER — APPOINTMENT (OUTPATIENT)
Dept: OTOLARYNGOLOGY | Facility: CLINIC | Age: 74
End: 2024-03-05
Payer: MEDICARE

## 2024-03-05 VITALS
BODY MASS INDEX: 27.48 KG/M2 | DIASTOLIC BLOOD PRESSURE: 68 MMHG | SYSTOLIC BLOOD PRESSURE: 144 MMHG | HEIGHT: 60 IN | WEIGHT: 140 LBS

## 2024-03-05 DIAGNOSIS — D11.0 BENIGN NEOPLASM OF PAROTID GLAND: ICD-10-CM

## 2024-03-05 DIAGNOSIS — K11.9 DISEASE OF SALIVARY GLAND, UNSPECIFIED: ICD-10-CM

## 2024-03-05 PROCEDURE — 76536 US EXAM OF HEAD AND NECK: CPT

## 2024-03-05 PROCEDURE — 99213 OFFICE O/P EST LOW 20 MIN: CPT

## 2024-03-05 NOTE — DATA REVIEWED
[de-identified] : US with L. parotid mass measuring 1.73 x 1.18 x 1.25 cm - smaller.  There are multiple other smaller lesions. On the right, there are smaller parotid lesions/nodes superiorly deep parotid with largest one measuring 1.25 x 0.53 x 1.16 cm - stable.

## 2024-03-05 NOTE — HISTORY OF PRESENT ILLNESS
[de-identified] : 73 year old female presents for follow up of Warthin's Tumor. s/p Right parotidectomy and reconstruction with sternocleidomastoid muscle flap 3/9/22. States still having pain at the surgical site sometimes and the area is still numb.  Reports she feels a tightness in the right neck (pulling sensation). Reports intermittent left parotid swelling and some discomfort, itchiness Patient denies globus sensation, dysphagia, odynophagia, dyspnea, dysphonia, hemoptysis or otalgia. Denies recent fevers/infections, chills, night sweats, weight loss.

## 2024-06-21 NOTE — ASU PATIENT PROFILE, ADULT - MUTUALITY COMMENT, PROFILE
Patient: Allen N Pha    Procedure: Procedure(s):  ESOPHAGOGASTRODUODENOSCOPY       Anesthesia Type:  MAC    Note:     Postop Pain Control: Uneventful            Sign Out: Well controlled pain   PONV: No   Neuro/Psych: Uneventful            Sign Out: Acceptable/Baseline neuro status   Airway/Respiratory: Uneventful            Sign Out: Acceptable/Baseline resp. status   CV/Hemodynamics: Uneventful            Sign Out: Acceptable CV status; No obvious hypovolemia; No obvious fluid overload   Other NRE: NONE   DID A NON-ROUTINE EVENT OCCUR? No           Last vitals:  Vitals Value Taken Time   /65 06/21/24 1031   Temp 36.2  C (97.1  F) 06/21/24 1030   Pulse 64 06/21/24 1031   Resp 12 06/21/24 1030   SpO2 95 % 06/21/24 1031   Vitals shown include unfiled device data.    Electronically Signed By: Jimmy Conway MD  June 21, 2024  10:55 AM  
pt will speak  to surgeon and anesthesiologist pre op

## 2024-08-04 ENCOUNTER — NON-APPOINTMENT (OUTPATIENT)
Age: 74
End: 2024-08-04

## 2024-08-08 ENCOUNTER — APPOINTMENT (OUTPATIENT)
Dept: ORTHOPEDIC SURGERY | Facility: CLINIC | Age: 74
End: 2024-08-08

## 2024-08-08 PROBLEM — E78.00 HIGH CHOLESTEROL: Status: RESOLVED | Noted: 2024-08-08 | Resolved: 2024-08-08

## 2024-08-08 PROBLEM — M70.51 BURSITIS OF RIGHT KNEE: Status: ACTIVE | Noted: 2024-08-08

## 2024-08-08 PROBLEM — M17.11 ARTHRITIS OF KNEE, RIGHT: Status: ACTIVE | Noted: 2024-08-08

## 2024-08-08 PROBLEM — E11.9 DIABETES: Status: RESOLVED | Noted: 2024-08-08 | Resolved: 2024-08-08

## 2024-08-08 PROCEDURE — 73564 X-RAY EXAM KNEE 4 OR MORE: CPT | Mod: RT

## 2024-08-08 PROCEDURE — J3490M: CUSTOM

## 2024-08-08 PROCEDURE — 20611 DRAIN/INJ JOINT/BURSA W/US: CPT | Mod: RT

## 2024-08-08 PROCEDURE — 99204 OFFICE O/P NEW MOD 45 MIN: CPT | Mod: 25

## 2024-08-08 NOTE — PHYSICAL EXAM
[5___] : hamstring 5[unfilled]/5 [Right] : right knee [All Views] : anteroposterior, lateral, skyline, and anteroposterior standing [] : patient ambulates with assistive device [Moderate tricompartmental OA medial narrowing] : Moderate tricompartmental OA medial narrowing [TWNoteComboBox7] : flexion 110 degrees [de-identified] : extension 0 degrees

## 2024-08-08 NOTE — HISTORY OF PRESENT ILLNESS
[Rest] : rest [Gradual] : gradual [8] : 8 [7] : 7 [Dull/Aching] : dull/aching [Throbbing] : throbbing [Constant] : constant [Meds] : meds [Standing] : standing [Walking] : walking [Stairs] : stairs [de-identified] : 73 year old female with pain in the right knee, symptoms have been progressive over the years. Poss after fall 2 years ago.  Pain in the right knee with walking, stiffness getting up from a seated position, standing, sleeping. No mechanical symptoms.  She has attended PT program in the past without much help. She uses topical ointments, OTC medication with minimal relief. No recent injuries.  [] : no [FreeTextEntry1] : RT KNEE  [FreeTextEntry5] : Patient states she has had right knee pain for many years and has gotten worse over the years. Patient went to PT a few years ago and felt minimal relief. Some swelling.  [de-identified] : PCP

## 2024-08-08 NOTE — PROCEDURE
[FreeTextEntry3] : Large Joint Injection / Aspiration: Celestone, Lidocaine, Marcaine and Guidance Ultrasound Large Joint Injection was performed because of pain and inflammation. Anesthesia: ethyl chloride sprayed topically..  Celestone: An injection of Celestone 6 mg , 1 cc. Needle size: 22 gauge ,  Lidocaine: 3 cc.  Marcaine: 3 cc.   Medication was injected in the right knee. Patient has tried OTC's including aspirin, Ibuprofen, Aleve etc or prescription NSAIDS, and/or exercises at home and/ or physical therapy without satisfactory response and Patient has decreased mobility in the joint. After verbal consent using sterile preparation and technique. The risks, benefits, and alternatives to cortisone injection were explained in full to the patient. Risks outlined include but are not limited to infection, sepsis, bleeding, scarring, skin discoloration, temporary increase in pain, syncopal episode, failure to resolve symptoms, allergic reaction, symptom recurrence, and elevation of blood sugar in diabetics. Patient understood the risks. All questions were answered. After discussion of options, patient requested an injection. Oral informed consent was obtained and sterile prep was done of the injection site. Sterile technique was utilized for the procedure including the preparation of the solutions used for the injection. Patient tolerated the procedure well. Advised to ice the injection site this evening. Prep with betadine locally to site. Sterile technique used. Patient tolerated procedure well. Post Procedure Instructions: Patient was advised to call if redness, pain, or fever occur and apply ice for 15 min. out of every hour for the next 12-24 hours as tolerated. patient was advised to rest the joint(s) for 2 days.   Ultrasound Guidance was used for the following reasons: altered anatomic landmarks because of erosive arthritis.   Ultrasound guided injection was performed of the knee, visualization of the needle and placement of injection was performed without complication.

## 2024-08-21 ENCOUNTER — EMERGENCY (EMERGENCY)
Facility: HOSPITAL | Age: 74
LOS: 1 days | Discharge: HOME | End: 2024-08-21
Attending: EMERGENCY MEDICINE | Admitting: EMERGENCY MEDICINE
Payer: MEDICARE

## 2024-08-21 VITALS — TEMPERATURE: 98 F

## 2024-08-21 VITALS
RESPIRATION RATE: 16 BRPM | HEART RATE: 64 BPM | DIASTOLIC BLOOD PRESSURE: 74 MMHG | OXYGEN SATURATION: 98 % | HEIGHT: 62 IN | TEMPERATURE: 98 F | SYSTOLIC BLOOD PRESSURE: 134 MMHG | WEIGHT: 160.06 LBS

## 2024-08-21 DIAGNOSIS — Z98.49 CATARACT EXTRACTION STATUS, UNSPECIFIED EYE: Chronic | ICD-10-CM

## 2024-08-21 LAB
ALBUMIN SERPL ELPH-MCNC: 3.3 G/DL — SIGNIFICANT CHANGE UP (ref 3.3–5)
ALP SERPL-CCNC: 65 U/L — SIGNIFICANT CHANGE UP (ref 40–120)
ALT FLD-CCNC: 145 U/L — HIGH (ref 12–78)
ANION GAP SERPL CALC-SCNC: 9 MMOL/L — SIGNIFICANT CHANGE UP (ref 5–17)
APPEARANCE UR: CLEAR — SIGNIFICANT CHANGE UP
APTT BLD: 24.8 SEC — SIGNIFICANT CHANGE UP (ref 24.5–35.6)
AST SERPL-CCNC: 85 U/L — HIGH (ref 15–37)
BACTERIA # UR AUTO: ABNORMAL /HPF
BASOPHILS # BLD AUTO: 0.01 K/UL — SIGNIFICANT CHANGE UP (ref 0–0.2)
BASOPHILS NFR BLD AUTO: 0.1 % — SIGNIFICANT CHANGE UP (ref 0–2)
BILIRUB SERPL-MCNC: 0.4 MG/DL — SIGNIFICANT CHANGE UP (ref 0.2–1.2)
BILIRUB UR-MCNC: NEGATIVE — SIGNIFICANT CHANGE UP
BUN SERPL-MCNC: 22 MG/DL — SIGNIFICANT CHANGE UP (ref 7–23)
CALCIUM SERPL-MCNC: 8.9 MG/DL — SIGNIFICANT CHANGE UP (ref 8.5–10.1)
CHLORIDE SERPL-SCNC: 99 MMOL/L — SIGNIFICANT CHANGE UP (ref 96–108)
CO2 SERPL-SCNC: 27 MMOL/L — SIGNIFICANT CHANGE UP (ref 22–31)
COLOR SPEC: YELLOW — SIGNIFICANT CHANGE UP
CREAT SERPL-MCNC: 1.2 MG/DL — SIGNIFICANT CHANGE UP (ref 0.5–1.3)
DIFF PNL FLD: NEGATIVE — SIGNIFICANT CHANGE UP
EGFR: 47 ML/MIN/1.73M2 — LOW
EOSINOPHIL # BLD AUTO: 0.31 K/UL — SIGNIFICANT CHANGE UP (ref 0–0.5)
EOSINOPHIL NFR BLD AUTO: 2.8 % — SIGNIFICANT CHANGE UP (ref 0–6)
EPI CELLS # UR: PRESENT
FLUAV AG NPH QL: SIGNIFICANT CHANGE UP
FLUBV AG NPH QL: SIGNIFICANT CHANGE UP
GLUCOSE SERPL-MCNC: 126 MG/DL — HIGH (ref 70–99)
GLUCOSE UR QL: NEGATIVE MG/DL — SIGNIFICANT CHANGE UP
HCT VFR BLD CALC: 37.3 % — SIGNIFICANT CHANGE UP (ref 34.5–45)
HGB BLD-MCNC: 12.5 G/DL — SIGNIFICANT CHANGE UP (ref 11.5–15.5)
IMM GRANULOCYTES NFR BLD AUTO: 0.7 % — SIGNIFICANT CHANGE UP (ref 0–0.9)
INR BLD: 0.86 RATIO — SIGNIFICANT CHANGE UP (ref 0.85–1.18)
KETONES UR-MCNC: NEGATIVE MG/DL — SIGNIFICANT CHANGE UP
LACTATE SERPL-SCNC: 1.5 MMOL/L — SIGNIFICANT CHANGE UP (ref 0.7–2)
LEUKOCYTE ESTERASE UR-ACNC: ABNORMAL
LYMPHOCYTES # BLD AUTO: 1.7 K/UL — SIGNIFICANT CHANGE UP (ref 1–3.3)
LYMPHOCYTES # BLD AUTO: 15.3 % — SIGNIFICANT CHANGE UP (ref 13–44)
MCHC RBC-ENTMCNC: 26.9 PG — LOW (ref 27–34)
MCHC RBC-ENTMCNC: 33.5 GM/DL — SIGNIFICANT CHANGE UP (ref 32–36)
MCV RBC AUTO: 80.2 FL — SIGNIFICANT CHANGE UP (ref 80–100)
MONOCYTES # BLD AUTO: 0.63 K/UL — SIGNIFICANT CHANGE UP (ref 0–0.9)
MONOCYTES NFR BLD AUTO: 5.7 % — SIGNIFICANT CHANGE UP (ref 2–14)
NEUTROPHILS # BLD AUTO: 8.36 K/UL — HIGH (ref 1.8–7.4)
NEUTROPHILS NFR BLD AUTO: 75.4 % — SIGNIFICANT CHANGE UP (ref 43–77)
NITRITE UR-MCNC: NEGATIVE — SIGNIFICANT CHANGE UP
NRBC # BLD: 0 /100 WBCS — SIGNIFICANT CHANGE UP (ref 0–0)
PH UR: 6.5 — SIGNIFICANT CHANGE UP (ref 5–8)
PLATELET # BLD AUTO: 393 K/UL — SIGNIFICANT CHANGE UP (ref 150–400)
POTASSIUM SERPL-MCNC: 3.9 MMOL/L — SIGNIFICANT CHANGE UP (ref 3.5–5.3)
POTASSIUM SERPL-SCNC: 3.9 MMOL/L — SIGNIFICANT CHANGE UP (ref 3.5–5.3)
PROT SERPL-MCNC: 6.5 G/DL — SIGNIFICANT CHANGE UP (ref 6–8.3)
PROT UR-MCNC: SIGNIFICANT CHANGE UP MG/DL
PROTHROM AB SERPL-ACNC: 9.8 SEC — SIGNIFICANT CHANGE UP (ref 9.5–13)
RBC # BLD: 4.65 M/UL — SIGNIFICANT CHANGE UP (ref 3.8–5.2)
RBC # FLD: 16.9 % — HIGH (ref 10.3–14.5)
RBC CASTS # UR COMP ASSIST: 0 /HPF — SIGNIFICANT CHANGE UP (ref 0–4)
RSV RNA NPH QL NAA+NON-PROBE: SIGNIFICANT CHANGE UP
SARS-COV-2 RNA SPEC QL NAA+PROBE: SIGNIFICANT CHANGE UP
SODIUM SERPL-SCNC: 135 MMOL/L — SIGNIFICANT CHANGE UP (ref 135–145)
SP GR SPEC: 1.01 — SIGNIFICANT CHANGE UP (ref 1–1.03)
UROBILINOGEN FLD QL: 0.2 MG/DL — SIGNIFICANT CHANGE UP (ref 0.2–1)
WBC # BLD: 11.09 K/UL — HIGH (ref 3.8–10.5)
WBC # FLD AUTO: 11.09 K/UL — HIGH (ref 3.8–10.5)
WBC UR QL: 3 /HPF — SIGNIFICANT CHANGE UP (ref 0–5)

## 2024-08-21 PROCEDURE — 80053 COMPREHEN METABOLIC PANEL: CPT

## 2024-08-21 PROCEDURE — 85025 COMPLETE CBC W/AUTO DIFF WBC: CPT

## 2024-08-21 PROCEDURE — 93005 ELECTROCARDIOGRAM TRACING: CPT

## 2024-08-21 PROCEDURE — 99285 EMERGENCY DEPT VISIT HI MDM: CPT

## 2024-08-21 PROCEDURE — 87040 BLOOD CULTURE FOR BACTERIA: CPT

## 2024-08-21 PROCEDURE — 87086 URINE CULTURE/COLONY COUNT: CPT

## 2024-08-21 PROCEDURE — 70450 CT HEAD/BRAIN W/O DYE: CPT | Mod: MC

## 2024-08-21 PROCEDURE — 71045 X-RAY EXAM CHEST 1 VIEW: CPT | Mod: 26

## 2024-08-21 PROCEDURE — 85730 THROMBOPLASTIN TIME PARTIAL: CPT

## 2024-08-21 PROCEDURE — 99285 EMERGENCY DEPT VISIT HI MDM: CPT | Mod: 25

## 2024-08-21 PROCEDURE — 71045 X-RAY EXAM CHEST 1 VIEW: CPT

## 2024-08-21 PROCEDURE — 85610 PROTHROMBIN TIME: CPT

## 2024-08-21 PROCEDURE — 87150 DNA/RNA AMPLIFIED PROBE: CPT

## 2024-08-21 PROCEDURE — 81001 URINALYSIS AUTO W/SCOPE: CPT

## 2024-08-21 PROCEDURE — 93010 ELECTROCARDIOGRAM REPORT: CPT

## 2024-08-21 PROCEDURE — 36415 COLL VENOUS BLD VENIPUNCTURE: CPT

## 2024-08-21 PROCEDURE — 82962 GLUCOSE BLOOD TEST: CPT

## 2024-08-21 PROCEDURE — 83605 ASSAY OF LACTIC ACID: CPT

## 2024-08-21 PROCEDURE — 70450 CT HEAD/BRAIN W/O DYE: CPT | Mod: 26,MC

## 2024-08-21 PROCEDURE — 87077 CULTURE AEROBIC IDENTIFY: CPT

## 2024-08-21 PROCEDURE — 87637 SARSCOV2&INF A&B&RSV AMP PRB: CPT

## 2024-08-21 RX ORDER — BACTERIOSTATIC SODIUM CHLORIDE 0.9 %
1000 VIAL (ML) INJECTION ONCE
Refills: 0 | Status: COMPLETED | OUTPATIENT
Start: 2024-08-21 | End: 2024-08-21

## 2024-08-21 RX ADMIN — Medication 1000 MILLILITER(S): at 10:15

## 2024-08-21 NOTE — ED PROVIDER NOTE - OBJECTIVE STATEMENT
75yo female bib ems with period of unresponsiveness according to the son tp was in the bathroom with the aid and went unresponsive, closed her eyes and did not respond to any verbal commands, no vomiting or diuarreah, pt had difficulty communicating while ambulating back to bed no chest pain or sob no other complaints

## 2024-08-21 NOTE — ED ADULT NURSE NOTE - OBJECTIVE STATEMENT
74y pt BIBEMS to ED, a&ox3, clear speech. pt's son called EMS when he found pt/mother feinted on toilet about 15 minutes.. per son, pt has had multiple episodes recently. no fall/trauma today. pt reports feeling fatigued in general. pt denies CP/palpitations, SOB,  blurry vision, N/V/D at this moment. #20 IV to RT AC, IVF initiated, labs drawn/sent.

## 2024-08-21 NOTE — ED ADULT NURSE NOTE - NSFALLRISKINTERV_ED_ALL_ED

## 2024-08-22 LAB
-  BLOOD PCR PANEL: SIGNIFICANT CHANGE UP
CULTURE RESULTS: SIGNIFICANT CHANGE UP
GRAM STN FLD: ABNORMAL
GRAM STN FLD: ABNORMAL
METHOD TYPE: SIGNIFICANT CHANGE UP
SPECIMEN SOURCE: SIGNIFICANT CHANGE UP
SPECIMEN SOURCE: SIGNIFICANT CHANGE UP

## 2024-08-22 NOTE — PROVIDER CONTACT NOTE (CRITICAL VALUE NOTIFICATION) - TEST AND RESULT REPORTED:
blood culture collected on 8/21/2024- prelim- growth in the aerobic bottle for gram positive cocci in clusters and gram negative rods.

## 2024-08-23 LAB
CULTURE RESULTS: ABNORMAL
CULTURE RESULTS: ABNORMAL
SPECIMEN SOURCE: SIGNIFICANT CHANGE UP

## 2024-08-24 LAB
CULTURE RESULTS: ABNORMAL
ORGANISM # SPEC MICROSCOPIC CNT: ABNORMAL
ORGANISM # SPEC MICROSCOPIC CNT: SIGNIFICANT CHANGE UP
SPECIMEN SOURCE: SIGNIFICANT CHANGE UP

## 2024-09-03 ENCOUNTER — APPOINTMENT (OUTPATIENT)
Dept: OTOLARYNGOLOGY | Facility: CLINIC | Age: 74
End: 2024-09-03
Payer: MEDICARE

## 2024-09-03 VITALS
OXYGEN SATURATION: 99 % | HEART RATE: 97 BPM | DIASTOLIC BLOOD PRESSURE: 66 MMHG | HEIGHT: 60 IN | BODY MASS INDEX: 27.48 KG/M2 | WEIGHT: 140 LBS | SYSTOLIC BLOOD PRESSURE: 106 MMHG

## 2024-09-03 DIAGNOSIS — D11.0 BENIGN NEOPLASM OF PAROTID GLAND: ICD-10-CM

## 2024-09-03 PROCEDURE — 76536 US EXAM OF HEAD AND NECK: CPT

## 2024-09-03 PROCEDURE — 99213 OFFICE O/P EST LOW 20 MIN: CPT

## 2024-09-03 NOTE — PHYSICAL EXAM
[Midline] : trachea located in midline position [Normal] : no rashes [de-identified] : R. incision healing well, no collections, no palpable masses, no LAD. L. parotid mass, approx. 1.5 cm, mobile, stable.  No TTP.

## 2024-09-03 NOTE — HISTORY OF PRESENT ILLNESS
[de-identified] : 74 year old woman presents for follow up of Warthin's Tumor. s/p Right parotidectomy and reconstruction with sternocleidomastoid muscle flap 3/9/22. Reports she is still having pain at the surgical site sometimes and the area is still numb. Reports she feels a tightness in the right neck (pulling sensation). Reports intermittent left parotid swelling.  Recently was on oral antibiotics and drinking syrup like medication (unsure of name) for throat ulcer with noted relief.  Reports some mild odynophagia due to ulcer that is improving with time. Patient denies globus sensation, dysphagia, dyspnea, dysphonia, hemoptysis, otalgia or recent fevers.

## 2024-09-03 NOTE — DATA REVIEWED
[de-identified] : US with L. parotid mass measuring 1.83 x 1.25 x 1.32 cm - slightly larger.  There are multiple other smaller lesions. On the right, there are smaller parotid lesions/nodes superiorly deep parotid with largest one measuring 1.84 x 1.11 x 1.38 cm - larger.

## 2024-09-03 NOTE — REASON FOR VISIT
[Subsequent Evaluation] : a subsequent evaluation for [Other: _____] : [unfilled] [Patient Declined  Services] : - None: Patient declined  services [FreeTextEntry2] : oncocytoma [Interpreters_Relationshiptopatient] : Son [FreeTextEntry3] : Patient declined offer of translation service. Patient preferred to use accompanying family member/friend for translation.  [TWNoteComboBox1] : Lisa

## 2024-09-12 ENCOUNTER — APPOINTMENT (OUTPATIENT)
Dept: ORTHOPEDIC SURGERY | Facility: CLINIC | Age: 74
End: 2024-09-12

## 2024-09-16 ENCOUNTER — APPOINTMENT (OUTPATIENT)
Dept: ORTHOPEDIC SURGERY | Facility: CLINIC | Age: 74
End: 2024-09-16

## 2024-09-16 VITALS — WEIGHT: 140 LBS | HEIGHT: 60 IN | BODY MASS INDEX: 27.48 KG/M2

## 2024-09-16 DIAGNOSIS — M17.11 UNILATERAL PRIMARY OSTEOARTHRITIS, RIGHT KNEE: ICD-10-CM

## 2024-09-16 PROCEDURE — 99213 OFFICE O/P EST LOW 20 MIN: CPT | Mod: 25

## 2024-09-16 PROCEDURE — 20611 DRAIN/INJ JOINT/BURSA W/US: CPT | Mod: RT

## 2024-09-16 RX ORDER — MELOXICAM 15 MG/1
15 TABLET ORAL
Qty: 30 | Refills: 2 | Status: ACTIVE | COMMUNITY
Start: 2024-09-16 | End: 1900-01-01

## 2024-09-16 NOTE — PROCEDURE
[Large Joint Injection] : Large joint injection [Right] : of the right [Knee] : knee [Pain] : pain [Inflammation] : inflammation [Betadine] : betadine [___ cc    0.25%] : Bupivacaine (Marcaine) ~Vcc of 0.25%  [Effusion] : effusion [] : Patient tolerated procedure well [Call if redness, pain or fever occur] : call if redness, pain or fever occur [Apply ice for 15min out of every hour for the next 12-24 hours as tolerated] : apply ice for 15 minutes out of every hour for the next 12-24 hours as tolerated [Patient was advised to rest the joint(s) for ____ days] : patient was advised to rest the joint(s) for [unfilled] days [All ultrasound images have been permanently captured and stored accordingly in our picture archiving and communication system] : All ultrasound images have been permanently captured and stored accordingly in our picture archiving and communication system [Previous OTC use and PT nontherapeutic] : patient has tried OTC's including aspirin, Ibuprofen, Aleve, etc or prescription NSAIDS, and/or exercises at home and/or physical therapy without satisfactory response [Patient had decreased mobility in the joint] : patient had decreased mobility in the joint [Risks, benefits, alternatives discussed / Verbal consent obtained] : the risks benefits, and alternatives have been discussed, and verbal consent was obtained [de-identified] : 20 ccs [de-identified] : clear

## 2024-09-16 NOTE — DISCUSSION/SUMMARY
[de-identified] : Patient allowed to gently start resuming activities. Discussed change to medication prescription and usage. Bracing options discussed with patient for stability and support. Activity modification as needed Discussed poss future surgery, pt deciding, questions answered, no guarantees, R TKA try topical lidocaine for pain control reviewed current medications used by this patient Home exercises for functional return offered asp translation jose antonio kan 09/16/2024  RE:  CHIDI WELLS   Acct #- [id number]  Attention:  Nurse Reviewer /Medical Director  I am writing this letter as a medical necessity for HA euflexxa R knee Patient has tried analgesics, non-steroid anti-inflammatory agents,  physical therapy, hot or cold compresses,injections of corticosteroids, etc)  which in combination or by themselves has not worked. Based on my patient's condition, I strongly believe that the Hyaluronic aid injections is medically needed.   Thank you for your time and consideration.    09/16/2024    RE:  CHIDI WELLS   Acct #- [id number]    Attention:  Nurse Reviewer /Medical Director  I am writing this letter as a medical necessity for PT program. Patient has tried analgesics, non-steroid anti-inflammatory agents,  hot or cold compresses,injections of corticosteroids, etc)  which in combination or by themselves has not worked. Based on my patient's condition, I strongly believe that the PT is medically needed.   Thank you for your time and consideration.

## 2024-09-16 NOTE — HISTORY OF PRESENT ILLNESS
[Gradual] : gradual [Dull/Aching] : dull/aching [Throbbing] : throbbing [Constant] : constant [Rest] : rest [Meds] : meds [Standing] : standing [Walking] : walking [Stairs] : stairs [8] : 8 [7] : 7 [de-identified] : pain in the right knee, symptoms have been progressive over the years. Poss after fall 2 years ago.  Pain in the right knee with walking, stiffness getting up from a seated position, standing, sleeping. Csi with temp help has some swelling [] : no [FreeTextEntry1] : RT KNEE  [FreeTextEntry5] : Patient states she has had right knee pain for many years and has gotten worse over the years. Patient went to PT a few years ago and felt minimal relief. Some swelling.  [FreeTextEntry9] : lidocaine patches  [de-identified] : PCP

## 2024-09-16 NOTE — PHYSICAL EXAM
[Right] : right knee [5___] : hamstring 5[unfilled]/5 [] : patient ambulates with assistive device [TWNoteComboBox7] : flexion 110 degrees [de-identified] : extension 0 degrees

## 2024-10-11 NOTE — ED ADULT NURSE NOTE - NSHISCREENINGQ1_ED_A_ED
13.3   7.72  )-----------( 202      ( 10 Oct 2024 19:27 )             41.7       10-10    140  |  102  |  14  ----------------------------<  75  3.9   |  25  |  1.17    Ca    9.4      10 Oct 2024 19:27  Phos  3.9     10-10  Mg     1.6     10-10           CAPILLARY BLOOD GLUCOSE                         Lactate Trend      Urinalysis Basic - ( 10 Oct 2024 22:32 )    Color: Yellow / Appearance: Turbid / S.016 / pH: x  Gluc: x / Ketone: Trace mg/dL  / Bili: Negative / Urobili: 1.0 mg/dL   Blood: x / Protein: 100 mg/dL / Nitrite: Negative   Leuk Esterase: Large / RBC: 5 /HPF /  /HPF   Sq Epi: x / Non Sq Epi: 1 /HPF / Bacteria: Many /HPF              RADIOLOGY, EKG & ADDITIONAL TESTS: Reviewed.
No

## 2024-12-04 NOTE — HISTORY OF PRESENT ILLNESS
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  PT c/o R neck swelling and pain.  She also has L neck swelling.  Pt denies dysphonia, dysphagia, and otalgia.  Her weight is stable.\par 
100

## 2025-03-04 ENCOUNTER — APPOINTMENT (OUTPATIENT)
Dept: OTOLARYNGOLOGY | Facility: CLINIC | Age: 75
End: 2025-03-04
Payer: MEDICARE

## 2025-03-04 VITALS
HEART RATE: 82 BPM | DIASTOLIC BLOOD PRESSURE: 73 MMHG | SYSTOLIC BLOOD PRESSURE: 131 MMHG | OXYGEN SATURATION: 99 % | BODY MASS INDEX: 27.48 KG/M2 | WEIGHT: 140 LBS | HEIGHT: 60 IN

## 2025-03-04 DIAGNOSIS — D11.0 BENIGN NEOPLASM OF PAROTID GLAND: ICD-10-CM

## 2025-03-04 PROCEDURE — 99213 OFFICE O/P EST LOW 20 MIN: CPT

## 2025-03-04 PROCEDURE — 76536 US EXAM OF HEAD AND NECK: CPT

## 2025-04-07 NOTE — ED ADULT NURSE NOTE - PUPILS PERRL
Lee(cardiology fellow) at [facility] Aime ([phone]).         yes

## 2025-05-28 ENCOUNTER — APPOINTMENT (OUTPATIENT)
Dept: ORTHOPEDIC SURGERY | Facility: CLINIC | Age: 75
End: 2025-05-28
Payer: MEDICARE

## 2025-05-28 DIAGNOSIS — M17.11 UNILATERAL PRIMARY OSTEOARTHRITIS, RIGHT KNEE: ICD-10-CM

## 2025-05-28 PROCEDURE — 20611 DRAIN/INJ JOINT/BURSA W/US: CPT | Mod: RT

## 2025-05-28 PROCEDURE — J3490M: CUSTOM | Mod: JZ

## 2025-05-28 PROCEDURE — 99204 OFFICE O/P NEW MOD 45 MIN: CPT | Mod: 25

## 2025-05-28 RX ORDER — CELECOXIB 200 MG/1
200 CAPSULE ORAL
Qty: 30 | Refills: 0 | Status: ACTIVE | COMMUNITY
Start: 2025-05-28 | End: 1900-01-01

## 2025-07-09 ENCOUNTER — APPOINTMENT (OUTPATIENT)
Dept: ORTHOPEDIC SURGERY | Facility: CLINIC | Age: 75
End: 2025-07-09

## (undated) DEVICE — DRSG STERISTRIPS 0.25 X 3"

## (undated) DEVICE — DRAPE TOWEL BLUE 17" X 24"

## (undated) DEVICE — VENODYNE/SCD SLEEVE CALF MEDIUM

## (undated) DEVICE — DRAIN RESERVOIR FOR JACKSON PRATT 100CC CARDINAL

## (undated) DEVICE — SUT ETHILON 5-0 18" PS-2

## (undated) DEVICE — LABELS BLANK W PEN

## (undated) DEVICE — PREP BETADINE SPONGE STICKS

## (undated) DEVICE — RUBBER BANDS STERILE

## (undated) DEVICE — SUT VICRYL 3-0 27" SH UNDYED

## (undated) DEVICE — DRAIN BLAKE 10FR ROUND

## (undated) DEVICE — DRAPE 3/4 SHEET 52X76"

## (undated) DEVICE — POSITIONER FOAM EGG CRATE ULNAR 2PCS (PINK)

## (undated) DEVICE — ELCTR BOVIE PENCIL SMOKE EVACUATION

## (undated) DEVICE — SUT SILK 2-0 30" SH

## (undated) DEVICE — DRAPE SPLIT SHEET 77" X 120"

## (undated) DEVICE — WARMING BLANKET LOWER ADULT

## (undated) DEVICE — DRAPE MAGNETIC INSTRUMENT MEDIUM

## (undated) DEVICE — SOL IRR POUR NS 0.9% 1500ML

## (undated) DEVICE — GLV 8 PROTEXIS (WHITE)

## (undated) DEVICE — CANISTER DISPOSABLE THIN WALL 3000CC

## (undated) DEVICE — SYR LUER LOK 5CC

## (undated) DEVICE — TONSIL ROLLS

## (undated) DEVICE — ELCTR GROUNDING PAD ADULT COVIDIEN

## (undated) DEVICE — STAPLER SKIN VISI-STAT 35 WIDE

## (undated) DEVICE — NERVE LOCATOR  III

## (undated) DEVICE — PACK HEAD & NECK